# Patient Record
Sex: FEMALE | Race: WHITE | Employment: OTHER | URBAN - METROPOLITAN AREA
[De-identification: names, ages, dates, MRNs, and addresses within clinical notes are randomized per-mention and may not be internally consistent; named-entity substitution may affect disease eponyms.]

---

## 2017-03-14 ENCOUNTER — PATIENT OUTREACH (OUTPATIENT)
Dept: CASE MANAGEMENT | Age: 77
End: 2017-03-14

## 2017-03-23 RX ORDER — FLUTICASONE PROPIONATE 50 MCG
SPRAY, SUSPENSION (ML) NASAL
Qty: 1 BOTTLE | Refills: 3 | Status: SHIPPED | OUTPATIENT
Start: 2017-03-23 | End: 2018-08-14

## 2017-03-29 DIAGNOSIS — E03.9 ACQUIRED HYPOTHYROIDISM: Primary | ICD-10-CM

## 2017-03-30 ENCOUNTER — TELEPHONE (OUTPATIENT)
Dept: FAMILY MEDICINE CLINIC | Facility: CLINIC | Age: 77
End: 2017-03-30

## 2017-03-30 ENCOUNTER — PATIENT MESSAGE (OUTPATIENT)
Dept: FAMILY MEDICINE CLINIC | Facility: CLINIC | Age: 77
End: 2017-03-30

## 2017-03-31 ENCOUNTER — TELEPHONE (OUTPATIENT)
Dept: FAMILY MEDICINE CLINIC | Facility: CLINIC | Age: 77
End: 2017-03-31

## 2017-03-31 NOTE — TELEPHONE ENCOUNTER
Pharmacy called- spoke to pharmacist- we were contacted by them to combine 2 pills to one. Patient takes a combination of Nature-Throid 32.5mg and 16.25. Now pharmacy saying they did not. They will ask patient her preference and fill accordingly.

## 2017-03-31 NOTE — TELEPHONE ENCOUNTER
From: Edwige Guerrero  To: Angel Ross MD  Sent: 3/30/2017 10:47 AM CDT  Subject: Prescription Question    For the Thyroid prescription and the Estriol cream only, we use [facility].  [facility] called with the prescription for the Thyroid refill a

## 2017-03-31 NOTE — TELEPHONE ENCOUNTER
Daryl Verma calling, questions on patients med Cassius Felicia Ramiresid medication, she normally takes 32.5mgs and 16mgs.    They did not received the correct dosage to fill., please call

## 2017-05-03 ENCOUNTER — TELEPHONE (OUTPATIENT)
Dept: FAMILY MEDICINE CLINIC | Facility: CLINIC | Age: 77
End: 2017-05-03

## 2017-05-24 ENCOUNTER — TELEPHONE (OUTPATIENT)
Dept: FAMILY MEDICINE CLINIC | Facility: CLINIC | Age: 77
End: 2017-05-24

## 2017-05-24 RX ORDER — ESTRADIOL 0.1 MG/G
1 CREAM VAGINAL DAILY
Qty: 42.5 G | Refills: 2 | Status: SHIPPED | OUTPATIENT
Start: 2017-05-24 | End: 2017-05-24

## 2017-05-24 RX ORDER — ESTRADIOL 0.1 MG/G
1 CREAM VAGINAL DAILY
Qty: 42.5 G | Refills: 2 | Status: SHIPPED | OUTPATIENT
Start: 2017-05-24 | End: 2017-05-25 | Stop reason: ALTCHOICE

## 2017-05-25 ENCOUNTER — TELEPHONE (OUTPATIENT)
Dept: FAMILY MEDICINE CLINIC | Facility: CLINIC | Age: 77
End: 2017-05-25

## 2017-05-25 NOTE — TELEPHONE ENCOUNTER
Informed Usman of 's approval for this med 3 days per week. She expressed understanding. Task completed.

## 2017-05-25 NOTE — TELEPHONE ENCOUNTER
Dr. Elliot Oscar,  I spoke to [facility] and she said this is the same as Estradiol. Patient has had in the past but I do not see in Epic.  Please advise    Usman from [facility] called stated that pt is requesting the Estriol prescription i

## 2017-05-25 NOTE — TELEPHONE ENCOUNTER
Usman from [address] [place] [address] called stated that pt is requesting the Estriol prescription instead. Please call and advise.

## 2017-07-07 ENCOUNTER — APPOINTMENT (OUTPATIENT)
Dept: GENERAL RADIOLOGY | Age: 77
End: 2017-07-07
Attending: FAMILY MEDICINE
Payer: MEDICARE

## 2017-07-07 ENCOUNTER — HOSPITAL ENCOUNTER (OUTPATIENT)
Age: 77
Discharge: HOME | End: 2017-07-07
Attending: FAMILY MEDICINE
Payer: MEDICARE

## 2017-07-07 ENCOUNTER — TELEPHONE (OUTPATIENT)
Dept: FAMILY MEDICINE CLINIC | Facility: CLINIC | Age: 77
End: 2017-07-07

## 2017-07-07 VITALS
HEIGHT: 64 IN | DIASTOLIC BLOOD PRESSURE: 85 MMHG | BODY MASS INDEX: 18.1 KG/M2 | SYSTOLIC BLOOD PRESSURE: 152 MMHG | HEART RATE: 87 BPM | RESPIRATION RATE: 16 BRPM | WEIGHT: 106 LBS | OXYGEN SATURATION: 99 % | TEMPERATURE: 99 F

## 2017-07-07 DIAGNOSIS — R03.0 BLOOD PRESSURE ELEVATED WITHOUT HISTORY OF HTN: ICD-10-CM

## 2017-07-07 DIAGNOSIS — S29.011A CHEST WALL MUSCLE STRAIN, INITIAL ENCOUNTER: Primary | ICD-10-CM

## 2017-07-07 PROCEDURE — 99213 OFFICE O/P EST LOW 20 MIN: CPT

## 2017-07-07 PROCEDURE — 71020 XR CHEST PA + LAT CHEST (CPT=71020): CPT | Performed by: FAMILY MEDICINE

## 2017-07-07 RX ORDER — CHLORAL HYDRATE 500 MG
1000 CAPSULE ORAL DAILY
COMMUNITY
End: 2018-10-16

## 2017-07-07 RX ORDER — NITROFURANTOIN 25; 75 MG/1; MG/1
100 CAPSULE ORAL 2 TIMES DAILY
Qty: 14 CAPSULE | Refills: 0 | Status: SHIPPED | OUTPATIENT
Start: 2017-07-07 | End: 2017-07-07

## 2017-07-07 NOTE — ED INITIAL ASSESSMENT (HPI)
The patient is here with complaints of intermittent left breast pain x 1 week. She states she does a lot of yoga and has completed some new moves. She has also been using the left arm to lift a heavy watering can to water hanging flowers.   She has been u

## 2017-07-07 NOTE — TELEPHONE ENCOUNTER
Patient states she has a pulling sensation under left breast bone. Hx of lung issues. Referred to Urgent care. Started 1 1/2 weeks ago. Denies chest pain.

## 2017-07-07 NOTE — ED PROVIDER NOTES
Patient Seen in: [facility]    History   Patient presents with:  Breast Problem (mammary)    Stated Complaint: pain in left breast area x 1 week    HPI    Pleasant 62-year-old lady comes in today for evaluation.   Patient st MCG/ACT Nasal Suspension,  INHALE 2 SPRAYS IN EACH NOSTRIL DAILY   PROAIR  (90 BASE) MCG/ACT Inhalation Aero Soln,  INHALE 2 PUFFS INTO THE LUNGS EVERY 6 (SIX) HOURS AS NEEDED FOR WHEEZING.    benzonatate 100 MG Oral Cap,  Take 1 capsule (100 mg tota EOMI.  EENT: TMs wnl bilaterally, Nares wnl, OP - wnl, no obstructions, Uvula in midline   NECK: FROM, supple, No LAD noted bilaterally  LUNGS: CTAB bilaterally, no wheeze or rales, no rhonchi   CV: S1 S2 heard, no murmur, no gallop  Chest wall.:Equal and  on 7/07/2017 at 16:46     Approved by:  Alex Ribera DO            ============================================================  ED Course  ------------------------------------------------------------  Time: 07/07 2089  Comment: Althea and discussed CXR

## 2017-07-07 NOTE — TELEPHONE ENCOUNTER
Pt states she has scar tissue under her lung and asthma/ she was doing some lifting and feels a burning sensation by her lung/ heart area.  Please advise

## 2017-10-16 ENCOUNTER — OFFICE VISIT (OUTPATIENT)
Dept: FAMILY MEDICINE CLINIC | Facility: CLINIC | Age: 77
End: 2017-10-16

## 2017-10-16 ENCOUNTER — APPOINTMENT (OUTPATIENT)
Dept: LAB | Age: 77
End: 2017-10-16
Attending: FAMILY MEDICINE
Payer: MEDICARE

## 2017-10-16 VITALS
HEIGHT: 63 IN | RESPIRATION RATE: 16 BRPM | TEMPERATURE: 98 F | SYSTOLIC BLOOD PRESSURE: 112 MMHG | DIASTOLIC BLOOD PRESSURE: 68 MMHG | OXYGEN SATURATION: 98 % | BODY MASS INDEX: 18.43 KG/M2 | WEIGHT: 104 LBS | HEART RATE: 103 BPM

## 2017-10-16 DIAGNOSIS — Z00.00 ENCOUNTER FOR ANNUAL HEALTH EXAMINATION: Primary | ICD-10-CM

## 2017-10-16 DIAGNOSIS — Z12.31 VISIT FOR SCREENING MAMMOGRAM: ICD-10-CM

## 2017-10-16 DIAGNOSIS — Z13.6 SCREENING FOR CARDIOVASCULAR CONDITION: ICD-10-CM

## 2017-10-16 DIAGNOSIS — Z23 NEED FOR VACCINATION: ICD-10-CM

## 2017-10-16 DIAGNOSIS — Z12.11 SCREENING FOR COLON CANCER: ICD-10-CM

## 2017-10-16 DIAGNOSIS — Z00.00 ENCOUNTER FOR ANNUAL HEALTH EXAMINATION: ICD-10-CM

## 2017-10-16 PROCEDURE — G0009 ADMIN PNEUMOCOCCAL VACCINE: HCPCS | Performed by: FAMILY MEDICINE

## 2017-10-16 PROCEDURE — 80061 LIPID PANEL: CPT

## 2017-10-16 PROCEDURE — 80053 COMPREHEN METABOLIC PANEL: CPT

## 2017-10-16 PROCEDURE — 90670 PCV13 VACCINE IM: CPT | Performed by: FAMILY MEDICINE

## 2017-10-16 PROCEDURE — G0008 ADMIN INFLUENZA VIRUS VAC: HCPCS | Performed by: FAMILY MEDICINE

## 2017-10-16 PROCEDURE — 90653 IIV ADJUVANT VACCINE IM: CPT | Performed by: FAMILY MEDICINE

## 2017-10-16 PROCEDURE — 84443 ASSAY THYROID STIM HORMONE: CPT

## 2017-10-16 PROCEDURE — 36415 COLL VENOUS BLD VENIPUNCTURE: CPT

## 2017-10-16 PROCEDURE — G0439 PPPS, SUBSEQ VISIT: HCPCS | Performed by: FAMILY MEDICINE

## 2017-10-16 NOTE — PROGRESS NOTES
HPI:   Sho Rueda is a 78year old female who presents for a Medicare Subsequent Annual Wellness visit (Pt already had Initial Annual Wellness). Complains of pain in the right hip.   Had stretched a groin muscle several years ago slipping on a wet daily   FLUTICASONE PROPIONATE 50 MCG/ACT Nasal Suspension INHALE 2 SPRAYS IN EACH NOSTRIL DAILY   PROAIR  (90 BASE) MCG/ACT Inhalation Aero Soln INHALE 2 PUFFS INTO THE LUNGS EVERY 6 (SIX) HOURS AS NEEDED FOR WHEEZING.       MEDICAL INFORMATION:   S SINGLE DOSE (53758) FLU CLINIC 10/05/2015   • DTAP 04/04/2015   • HIGH DOSE FLU 65 YRS AND OLDER PRSV FREE SINGLE D (58949) FLU CLINIC 10/10/2016   • Influenza 10/05/2009, 10/06/2010, 10/10/2011, 10/08/2012   • Influenza Vaccine, No Preserv, 3YR + 10/06/20 activity?: Heavy    How would you describe your current health state?: Good    How do you maintain positive mental well-being?: Social Interaction;Puzzles; Visiting Friends; Visiting Family    If you are a male age 38-65 or a female age 47-67, do you take as patient and document. Then, refresh your progress note to see your input here.   Cognitive Assessment     What day of the week is this?: Correct    What month is it?: Correct    What year is it?: Correct    Recall \"Ball\": Correct    Recall \"Flag\": Co Mammogram      Mammogram Annually to 76, then as discussed Mammogram,1 Yr due on 10/10/2017 Update Health Maintenance if applicable     Immunizations (Update Immunization Activity if applicable)     Influenza  Covered Annually 10/10/2016 Please get every y found for: A1C No flowsheet data found. Creat/alb ratio  Annually      LDL  Annually LDL-CHOLESTEROL (mg/dL (calc))   Date Value   10/03/2011 91     LDL Cholesterol (mg/dL)   Date Value   10/10/2016 90    No flowsheet data found.      Dilated Eye exam  A

## 2017-10-16 NOTE — PATIENT INSTRUCTIONS
Monique Olsen's SCREENING SCHEDULE   Tests on this list are recommended by your physician but may not be covered, or covered at this frequency, by your insurer. Please check with your insurance carrier before scheduling to verify coverage.    PREVENTATI following criteria:   • Men who are 73-68 years old and have smoked more than 100 cigarettes in their lifetime   • Anyone with a family history    Colorectal Cancer Screening  Covered up to Age 76     Colonoscopy Screen   Covered every 10 years- more often or performed in visit on 10/10/16  -FLU VACC PRSV FREE INC ANTIG   -FLU VACC PRSV FREE INC ANTIG   Orders placed or performed in visit on 10/05/15  -FLU VAC NO PRSV 4 ADRIANA 3 YRS+   Orders placed or performed in visit on 10/06/14  -FLU VAC NO PRSV 4 ADRIANA 3 YR including definitions of the different types of Advance Directives. It also has the State forms available on it's website for anyone to review and print using their home computer and printer. (the forms are also available in [address])  www. [employer]. or

## 2017-10-18 ENCOUNTER — HOSPITAL ENCOUNTER (OUTPATIENT)
Dept: MAMMOGRAPHY | Age: 77
Discharge: HOME | End: 2017-10-18
Attending: FAMILY MEDICINE
Payer: MEDICARE

## 2017-10-18 DIAGNOSIS — Z12.31 VISIT FOR SCREENING MAMMOGRAM: ICD-10-CM

## 2017-10-18 PROCEDURE — 82272 OCCULT BLD FECES 1-3 TESTS: CPT

## 2017-10-18 PROCEDURE — 77067 SCR MAMMO BI INCL CAD: CPT | Performed by: FAMILY MEDICINE

## 2017-10-25 ENCOUNTER — APPOINTMENT (OUTPATIENT)
Dept: LAB | Facility: HOSPITAL | Age: 77
End: 2017-10-25
Attending: FAMILY MEDICINE
Payer: MEDICARE

## 2017-10-25 DIAGNOSIS — Z12.11 SCREENING FOR COLON CANCER: ICD-10-CM

## 2017-11-10 ENCOUNTER — PATIENT MESSAGE (OUTPATIENT)
Dept: FAMILY MEDICINE CLINIC | Facility: CLINIC | Age: 77
End: 2017-11-10

## 2017-11-10 DIAGNOSIS — J01.10 ACUTE NON-RECURRENT FRONTAL SINUSITIS: ICD-10-CM

## 2017-11-10 RX ORDER — CEPHALEXIN 500 MG/1
500 CAPSULE ORAL 3 TIMES DAILY
Qty: 30 CAPSULE | Refills: 0 | Status: SHIPPED | OUTPATIENT
Start: 2017-11-10 | End: 2017-11-20

## 2017-11-10 NOTE — TELEPHONE ENCOUNTER
----- Message from Lawrence Conteh sent at 11/10/2017 10:37 AM CST -----  Regarding: Prescription Question  Contact: [phone]  I have taken Cephalerin 500 mg. in the past for an upper respiratory or sinus infection.   I am leaving for [place] next month

## 2017-12-12 RX ORDER — MONTELUKAST SODIUM 10 MG/1
TABLET ORAL
Qty: 90 TABLET | Refills: 2 | Status: SHIPPED | OUTPATIENT
Start: 2017-12-12 | End: 2018-11-05

## 2018-02-01 ENCOUNTER — TELEPHONE (OUTPATIENT)
Dept: FAMILY MEDICINE CLINIC | Facility: CLINIC | Age: 78
End: 2018-02-01

## 2018-02-01 NOTE — TELEPHONE ENCOUNTER
Patient taking Cephalexin- tid-has medicine at home. recommended BRAT diet,increase probiotic. We have not seen patient for uti. Patient to keep us updated.

## 2018-03-22 NOTE — TELEPHONE ENCOUNTER
Last OV 10/16/17  Last refill  Nature Thyroid 3/4 48.75mg  3/3017    .   Thyroid:    Lab Results  Component Value Date   TSH 2.080 10/16/2017   TSH 3.220 10/10/2016   TSH 2.920 09/24/2015   T4F 0.8 (L) 10/06/2014

## 2018-03-26 ENCOUNTER — TELEPHONE (OUTPATIENT)
Dept: FAMILY MEDICINE CLINIC | Facility: CLINIC | Age: 78
End: 2018-03-26

## 2018-03-26 NOTE — TELEPHONE ENCOUNTER
Usman from [facility] called in regards for prescription for patient that was faxed over on Thursday. Prescription was for Thyroid (NATURE-THROID) 48.75 MG Oral Tab.

## 2018-04-13 ENCOUNTER — TELEPHONE (OUTPATIENT)
Dept: FAMILY MEDICINE CLINIC | Facility: CLINIC | Age: 78
End: 2018-04-13

## 2018-04-13 NOTE — TELEPHONE ENCOUNTER
Spoke to pt she states aware med not covered but doesn't want to change meds at this time she will pay cash

## 2018-04-13 NOTE — TELEPHONE ENCOUNTER
Gerry Marc from [address] [place] [address] called stated the insurance does not cover any natural meds/Nature-throid. Please prescribe another alternative. Please call and advise.

## 2018-06-12 ENCOUNTER — OFFICE VISIT (OUTPATIENT)
Dept: FAMILY MEDICINE CLINIC | Facility: CLINIC | Age: 78
End: 2018-06-12

## 2018-06-12 ENCOUNTER — PATIENT MESSAGE (OUTPATIENT)
Dept: FAMILY MEDICINE CLINIC | Facility: CLINIC | Age: 78
End: 2018-06-12

## 2018-06-12 VITALS
HEIGHT: 63 IN | RESPIRATION RATE: 18 BRPM | OXYGEN SATURATION: 98 % | HEART RATE: 102 BPM | TEMPERATURE: 98 F | DIASTOLIC BLOOD PRESSURE: 82 MMHG | SYSTOLIC BLOOD PRESSURE: 120 MMHG | BODY MASS INDEX: 18.43 KG/M2 | WEIGHT: 104 LBS

## 2018-06-12 DIAGNOSIS — R39.15 URINARY URGENCY: Primary | ICD-10-CM

## 2018-06-12 PROCEDURE — 99213 OFFICE O/P EST LOW 20 MIN: CPT | Performed by: FAMILY MEDICINE

## 2018-06-12 PROCEDURE — 87077 CULTURE AEROBIC IDENTIFY: CPT | Performed by: FAMILY MEDICINE

## 2018-06-12 PROCEDURE — 87086 URINE CULTURE/COLONY COUNT: CPT | Performed by: FAMILY MEDICINE

## 2018-06-12 PROCEDURE — 87186 SC STD MICRODIL/AGAR DIL: CPT | Performed by: FAMILY MEDICINE

## 2018-06-12 PROCEDURE — 81003 URINALYSIS AUTO W/O SCOPE: CPT | Performed by: FAMILY MEDICINE

## 2018-06-12 NOTE — PROGRESS NOTES
Reinaldo, [facility]  Cell # [phone]    Here for follow-up of recurrent UTI. She had a prescription for me for cephalexin which she took in late January. She was down in [place].   Symptoms improved but 2 days off of the antibiotics and they c PUFFS INTO THE LUNGS EVERY 6 (SIX) HOURS AS NEEDED FOR WHEEZING. Disp: 8.5 g Rfl: 0     ALLERGIES:   Advil [Ibuprofen]; Aspirin; Benadryl [Altaryl]; Codeine [Opioid Analgesics];  Erythromycin; Naproxen; Penicillins  FAMILY HISTORY  Family History   Problem

## 2018-06-12 NOTE — TELEPHONE ENCOUNTER
From: Jamilah Handing  To: Francisco Javier Orozco MD  Sent: 6/12/2018 12:22 PM CDT  Subject: Non-Urgent Medical Question    I received a notice that I had an after visit summary on e-mail. I don't see it on the web site.

## 2018-06-14 DIAGNOSIS — N39.0 URINARY TRACT INFECTION WITHOUT HEMATURIA, SITE UNSPECIFIED: Primary | ICD-10-CM

## 2018-06-14 RX ORDER — SULFAMETHOXAZOLE AND TRIMETHOPRIM 800; 160 MG/1; MG/1
1 TABLET ORAL 2 TIMES DAILY
Qty: 6 TABLET | Refills: 0 | Status: SHIPPED | OUTPATIENT
Start: 2018-06-14 | End: 2018-06-17

## 2018-06-19 ENCOUNTER — APPOINTMENT (OUTPATIENT)
Dept: LAB | Age: 78
End: 2018-06-19
Attending: FAMILY MEDICINE
Payer: MEDICARE

## 2018-06-19 DIAGNOSIS — N39.0 URINARY TRACT INFECTION WITHOUT HEMATURIA, SITE UNSPECIFIED: ICD-10-CM

## 2018-06-19 PROCEDURE — 87086 URINE CULTURE/COLONY COUNT: CPT

## 2018-06-19 PROCEDURE — 87186 SC STD MICRODIL/AGAR DIL: CPT

## 2018-06-19 PROCEDURE — 87077 CULTURE AEROBIC IDENTIFY: CPT

## 2018-06-21 ENCOUNTER — TELEPHONE (OUTPATIENT)
Dept: FAMILY MEDICINE CLINIC | Facility: CLINIC | Age: 78
End: 2018-06-21

## 2018-06-21 RX ORDER — LEVOFLOXACIN 500 MG/1
500 TABLET, FILM COATED ORAL DAILY
Qty: 5 TABLET | Refills: 0 | Status: SHIPPED | OUTPATIENT
Start: 2018-06-21 | End: 2018-07-01

## 2018-06-21 NOTE — TELEPHONE ENCOUNTER
----- Message from Carmencita Singh MD sent at 6/21/2018 10:20 AM CDT -----  Urine culture positive,  Allergic to pcn and erythromycin. resistent to ampicillin and sulfa. Treat with levaquin 500mg po daily for 5 days.

## 2018-06-23 ENCOUNTER — TELEPHONE (OUTPATIENT)
Dept: FAMILY MEDICINE CLINIC | Facility: CLINIC | Age: 78
End: 2018-06-23

## 2018-06-23 NOTE — TELEPHONE ENCOUNTER
Urine culture positive,  Allergic to pcn and erythromycin.  resistent to ampicillin and sulfa.  Treat with levaquin 500mg po daily for 5 days. Culture           Please see attached. Patient started on Levaquin per Pepe Saavedra last Thursday.  Today, she re

## 2018-06-25 ENCOUNTER — TELEPHONE (OUTPATIENT)
Dept: FAMILY MEDICINE CLINIC | Facility: CLINIC | Age: 78
End: 2018-06-25

## 2018-06-25 DIAGNOSIS — N39.0 URINARY TRACT INFECTION WITHOUT HEMATURIA, SITE UNSPECIFIED: Primary | ICD-10-CM

## 2018-06-25 NOTE — TELEPHONE ENCOUNTER
Okay for U/A and culture order? Pt wants to be sure infection has cleared and she will finish her meds tomorrow.

## 2018-06-25 NOTE — TELEPHONE ENCOUNTER
Pt has had reoccuring utis and is finished with her medication tomorrow.   Pt wants a lab put in the system to make sure she is clear  Please advise

## 2018-06-26 NOTE — TELEPHONE ENCOUNTER
Last Visit- 6/12/18    Last Refill Nature-Throid - 3/26/18    Last Refill on Estriol Vaginal - 5/26/17    Please approve or deny medications

## 2018-06-26 NOTE — TELEPHONE ENCOUNTER
Needs a refill on Nature Vivek  And  [facility]. She stated she asked for the refill last week. She was calling from the pharmacy and would like this ASAP. Pls call her back when this is refilled.   Or call her if a problem r

## 2018-06-26 NOTE — TELEPHONE ENCOUNTER
Approve/deny?  Last ov 6/12/18- urinary , last filled thyroid 3/26/18 x 3 months, estriol 5/26/17 PRN

## 2018-06-28 ENCOUNTER — APPOINTMENT (OUTPATIENT)
Dept: LAB | Age: 78
End: 2018-06-28
Attending: FAMILY MEDICINE
Payer: MEDICARE

## 2018-06-28 DIAGNOSIS — N39.0 URINARY TRACT INFECTION WITHOUT HEMATURIA, SITE UNSPECIFIED: ICD-10-CM

## 2018-06-28 LAB
BILIRUB UR QL STRIP.AUTO: NEGATIVE
CLARITY UR REFRACT.AUTO: CLEAR
GLUCOSE UR STRIP.AUTO-MCNC: NEGATIVE MG/DL
KETONES UR STRIP.AUTO-MCNC: NEGATIVE MG/DL
LEUKOCYTE ESTERASE UR QL STRIP.AUTO: NEGATIVE
NITRITE UR QL STRIP.AUTO: NEGATIVE
PH UR STRIP.AUTO: 6 [PH] (ref 4.5–8)
PROT UR STRIP.AUTO-MCNC: NEGATIVE MG/DL
RBC UR QL AUTO: NEGATIVE
SP GR UR STRIP.AUTO: <1.005 (ref 1–1.03)
UROBILINOGEN UR STRIP.AUTO-MCNC: <2 MG/DL

## 2018-06-28 PROCEDURE — 81003 URINALYSIS AUTO W/O SCOPE: CPT

## 2018-06-28 PROCEDURE — 87086 URINE CULTURE/COLONY COUNT: CPT

## 2018-08-14 ENCOUNTER — OFFICE VISIT (OUTPATIENT)
Dept: FAMILY MEDICINE CLINIC | Facility: CLINIC | Age: 78
End: 2018-08-14
Payer: MEDICARE

## 2018-08-14 VITALS
BODY MASS INDEX: 18.25 KG/M2 | SYSTOLIC BLOOD PRESSURE: 130 MMHG | TEMPERATURE: 99 F | OXYGEN SATURATION: 96 % | DIASTOLIC BLOOD PRESSURE: 80 MMHG | HEART RATE: 92 BPM | RESPIRATION RATE: 24 BRPM | WEIGHT: 103 LBS | HEIGHT: 63 IN

## 2018-08-14 DIAGNOSIS — R42 VERTIGO: Primary | ICD-10-CM

## 2018-08-14 PROCEDURE — 99213 OFFICE O/P EST LOW 20 MIN: CPT | Performed by: FAMILY MEDICINE

## 2018-08-14 RX ORDER — FLUTICASONE PROPIONATE 50 MCG
2 SPRAY, SUSPENSION (ML) NASAL DAILY
Qty: 1 BOTTLE | Refills: 1 | Status: SHIPPED | OUTPATIENT
Start: 2018-08-14 | End: 2018-10-16 | Stop reason: ALTCHOICE

## 2018-08-14 RX ORDER — MECLIZINE HCL 12.5 MG/1
12.5 TABLET ORAL 3 TIMES DAILY PRN
Qty: 30 TABLET | Refills: 0 | Status: SHIPPED | OUTPATIENT
Start: 2018-08-14 | End: 2018-10-16 | Stop reason: ALTCHOICE

## 2018-08-23 NOTE — PROGRESS NOTES
HPI:    Patient ID: Dea Olvera is a 78year old female. Vertigo   This is a new problem. The current episode started in the past 7 days. The problem occurs daily. The problem has been unchanged. Associated symptoms include vertigo.  Associated sympt Left eye exhibits no discharge. No scleral icterus. Neck: Normal range of motion. Neck supple. No thyromegaly present. Cardiovascular: Normal rate, regular rhythm, normal heart sounds and intact distal pulses.   Exam reveals no gallop and no friction ru

## 2018-09-10 ENCOUNTER — OFFICE VISIT (OUTPATIENT)
Dept: FAMILY MEDICINE CLINIC | Facility: CLINIC | Age: 78
End: 2018-09-10
Payer: MEDICARE

## 2018-09-10 VITALS
WEIGHT: 108 LBS | SYSTOLIC BLOOD PRESSURE: 120 MMHG | DIASTOLIC BLOOD PRESSURE: 70 MMHG | RESPIRATION RATE: 18 BRPM | TEMPERATURE: 98 F | HEIGHT: 63 IN | BODY MASS INDEX: 19.14 KG/M2 | HEART RATE: 86 BPM | OXYGEN SATURATION: 98 %

## 2018-09-10 DIAGNOSIS — J30.89 SEASONAL ALLERGIC RHINITIS DUE TO OTHER ALLERGIC TRIGGER: ICD-10-CM

## 2018-09-10 DIAGNOSIS — J01.00 ACUTE NON-RECURRENT MAXILLARY SINUSITIS: Primary | ICD-10-CM

## 2018-09-10 PROCEDURE — 99213 OFFICE O/P EST LOW 20 MIN: CPT | Performed by: FAMILY MEDICINE

## 2018-09-10 RX ORDER — CEPHALEXIN 500 MG/1
500 CAPSULE ORAL 3 TIMES DAILY
Qty: 30 CAPSULE | Refills: 0 | Status: SHIPPED | OUTPATIENT
Start: 2018-09-10 | End: 2018-10-16

## 2018-09-10 RX ORDER — AZELASTINE 1 MG/ML
1 SPRAY, METERED NASAL 2 TIMES DAILY
Qty: 30 ML | Refills: 2 | Status: SHIPPED | OUTPATIENT
Start: 2018-09-10

## 2018-09-10 NOTE — PROGRESS NOTES
Here with almost a full month of head congestion. She does suffer from ragweed and mold allergies. She saw Dr. Karri Hartley. She was having issues with dizziness and he prescribed meclizine.   Subsequently we have chatted via the my chart and have stop meclizin 1,000 mg by mouth daily. Disp:  Rfl:    PROAIR  (90 BASE) MCG/ACT Inhalation Aero Soln INHALE 2 PUFFS INTO THE LUNGS EVERY 6 (SIX) HOURS AS NEEDED FOR WHEEZING. Disp: 8.5 g Rfl: 0     ALLERGIES:   Advil [Ibuprofen]; Aspirin; Benadryl [Altaryl];  Code

## 2018-09-10 NOTE — PATIENT INSTRUCTIONS
Take a probiotic such as lactobacilis acidophilus while you are taking the antibiotic and for 7 days after the antibiotic is completed. This will help to prevent antibiotic associated diarrhea or C. Difficile. Hold steroid spray.   Start astelin instea

## 2018-09-13 ENCOUNTER — PATIENT MESSAGE (OUTPATIENT)
Dept: FAMILY MEDICINE CLINIC | Facility: CLINIC | Age: 78
End: 2018-09-13

## 2018-09-13 NOTE — TELEPHONE ENCOUNTER
From: Judson Grace  To: Warden Jaki MD  Sent: 9/13/2018 10:36 AM CDT  Subject: Prescription Question    I took the Cephalexin 500 mg. tablet as instructed, 3 times a day, for three days and was experiencing a lot of diarrhea on the third.  I cut valorie

## 2018-10-16 ENCOUNTER — OFFICE VISIT (OUTPATIENT)
Dept: FAMILY MEDICINE CLINIC | Facility: CLINIC | Age: 78
End: 2018-10-16
Payer: MEDICARE

## 2018-10-16 ENCOUNTER — APPOINTMENT (OUTPATIENT)
Dept: LAB | Age: 78
End: 2018-10-16
Attending: FAMILY MEDICINE
Payer: MEDICARE

## 2018-10-16 VITALS
HEIGHT: 63 IN | BODY MASS INDEX: 18.85 KG/M2 | TEMPERATURE: 98 F | DIASTOLIC BLOOD PRESSURE: 70 MMHG | OXYGEN SATURATION: 98 % | SYSTOLIC BLOOD PRESSURE: 138 MMHG | HEART RATE: 95 BPM | RESPIRATION RATE: 18 BRPM | WEIGHT: 106.38 LBS

## 2018-10-16 DIAGNOSIS — Z13.6 SCREENING FOR CARDIOVASCULAR CONDITION: ICD-10-CM

## 2018-10-16 DIAGNOSIS — Z80.3 FH: BREAST CANCER IN FIRST DEGREE RELATIVE: ICD-10-CM

## 2018-10-16 DIAGNOSIS — Z00.00 ENCOUNTER FOR ANNUAL HEALTH EXAMINATION: Primary | ICD-10-CM

## 2018-10-16 DIAGNOSIS — Z12.39 SCREENING FOR BREAST CANCER: ICD-10-CM

## 2018-10-16 DIAGNOSIS — Z00.00 ENCOUNTER FOR ANNUAL HEALTH EXAMINATION: ICD-10-CM

## 2018-10-16 DIAGNOSIS — M17.11 LOCALIZED OSTEOARTHRITIS OF RIGHT KNEE: ICD-10-CM

## 2018-10-16 DIAGNOSIS — J01.00 ACUTE NON-RECURRENT MAXILLARY SINUSITIS: ICD-10-CM

## 2018-10-16 DIAGNOSIS — Z23 NEED FOR VACCINATION: ICD-10-CM

## 2018-10-16 PROCEDURE — 80053 COMPREHEN METABOLIC PANEL: CPT

## 2018-10-16 PROCEDURE — G0439 PPPS, SUBSEQ VISIT: HCPCS | Performed by: FAMILY MEDICINE

## 2018-10-16 PROCEDURE — 80061 LIPID PANEL: CPT

## 2018-10-16 PROCEDURE — G0008 ADMIN INFLUENZA VIRUS VAC: HCPCS | Performed by: FAMILY MEDICINE

## 2018-10-16 PROCEDURE — 90653 IIV ADJUVANT VACCINE IM: CPT | Performed by: FAMILY MEDICINE

## 2018-10-16 PROCEDURE — 36415 COLL VENOUS BLD VENIPUNCTURE: CPT

## 2018-10-16 RX ORDER — CEPHALEXIN 500 MG/1
CAPSULE ORAL
Qty: 30 CAPSULE | Refills: 0 | Status: SHIPPED | OUTPATIENT
Start: 2018-10-16 | End: 2019-07-08

## 2018-10-16 RX ORDER — CHLORAL HYDRATE 500 MG
2000 CAPSULE ORAL DAILY
Qty: 60 CAPSULE | Refills: 12 | COMMUNITY
Start: 2018-10-16

## 2018-10-16 NOTE — PATIENT INSTRUCTIONS
Monique Olsen's SCREENING SCHEDULE   Tests on this list are recommended by your physician but may not be covered, or covered at this frequency, by your insurer. Please check with your insurance carrier before scheduling to verify coverage.    PREVENTATI who are 73-68 years old and have smoked more than 100 cigarettes in their lifetime   • Anyone with a family history    Colorectal Cancer Screening  Covered up to Age 76     Colonoscopy Screen   Covered every 10 years- more often if abnormal Colonoscopy due display for this patient.  Please get this Mammogram regularly   Immunizations      Influenza  Covered Annually Orders placed or performed in visit on 10/10/16   • FLU VACC PRSV FREE INC ANTIG   • FLU VACC PRSV FREE INC ANTIG   Orders placed or performed in http://www. idph.state. [place]/public/books/advin.htm  A link to the [employer]. This site has a lot of good information including definitions of the different types of Advance Directives.  It also has the State forms available on it's webs

## 2018-10-16 NOTE — PROGRESS NOTES
HPI:   Tanya Pereira is a 78year old female who presents for a Medicare Subsequent Annual Wellness visit (Pt already had Initial Annual Wellness). Complains of right knee pain. No locking or giving out.   Radiates down the front of the knee into th Problem List:     Postmenopausal atrophic vaginitis     Disorders of bilirubin excretion     Asthma     Esophageal reflux     Arthropathy     Primary localized osteoarthrosis, hand     Hypothyroid     Allergic rhinitis due to allergen     Mitral valve prol Personal history of pneumonia (recurrent), and Sprain of lumbar region. She  has a past surgical history that includes cholecystectomy; other surgical history; tonsillectomy; and colonoscopy.     Her family history includes Arthritis in her maternal gran HIGH DOSE FLU 65 YRS AND OLDER PRSV FREE SINGLE D (49125) FLU CLINIC 10/10/2016   • Influenza 10/05/2009, 10/06/2010, 10/10/2011, 10/08/2012   • Pneumococcal (Prevnar 13) 10/16/2017   • Pneumovax 23 07/17/2006, 11/01/2007, 10/07/2008   • TD 07/11/2005   • Family;Puzzles; Visiting Friends      This section provided for quick review of chart, separate sheet to patient  [address] [place] [address],[address] Internal Lab or Procedure External Lab or Procedure   Diabetes Screening      HbgA1C   Annually Maintenance if applicable     Immunizations (Update Immunization Activity if applicable)     Influenza  Covered Annually 10/16/2017 Please get every year    Pneumococcal 13 (Prevnar)  Covered Once after 65 10/16/2017 Please get once after your Shelly Dexterer

## 2018-10-17 ENCOUNTER — HOSPITAL ENCOUNTER (OUTPATIENT)
Dept: MAMMOGRAPHY | Age: 78
Discharge: HOME | End: 2018-10-17
Attending: FAMILY MEDICINE
Payer: MEDICARE

## 2018-10-17 DIAGNOSIS — Z80.3 FH: BREAST CANCER IN FIRST DEGREE RELATIVE: ICD-10-CM

## 2018-10-17 DIAGNOSIS — Z12.39 SCREENING FOR BREAST CANCER: ICD-10-CM

## 2018-10-17 PROCEDURE — 77067 SCR MAMMO BI INCL CAD: CPT | Performed by: FAMILY MEDICINE

## 2018-10-17 PROCEDURE — 77063 BREAST TOMOSYNTHESIS BI: CPT | Performed by: FAMILY MEDICINE

## 2018-10-25 ENCOUNTER — OFFICE VISIT (OUTPATIENT)
Dept: FAMILY MEDICINE CLINIC | Facility: CLINIC | Age: 78
End: 2018-10-25
Payer: MEDICARE

## 2018-10-25 VITALS
BODY MASS INDEX: 18.78 KG/M2 | HEART RATE: 75 BPM | HEIGHT: 63 IN | OXYGEN SATURATION: 98 % | RESPIRATION RATE: 18 BRPM | WEIGHT: 106 LBS | SYSTOLIC BLOOD PRESSURE: 132 MMHG | DIASTOLIC BLOOD PRESSURE: 84 MMHG

## 2018-10-25 DIAGNOSIS — M17.11 PRIMARY OSTEOARTHRITIS OF RIGHT KNEE: Primary | ICD-10-CM

## 2018-10-25 PROCEDURE — 20610 DRAIN/INJ JOINT/BURSA W/O US: CPT | Performed by: FAMILY MEDICINE

## 2018-10-25 RX ORDER — TRIAMCINOLONE ACETONIDE 40 MG/ML
40 INJECTION, SUSPENSION INTRA-ARTICULAR; INTRAMUSCULAR ONCE
Status: COMPLETED | OUTPATIENT
Start: 2018-10-25 | End: 2018-10-25

## 2018-10-25 RX ADMIN — TRIAMCINOLONE ACETONIDE 40 MG: 40 INJECTION, SUSPENSION INTRA-ARTICULAR; INTRAMUSCULAR at 13:57:00

## 2018-10-25 NOTE — PROGRESS NOTES
Here for steroid injection into the right knee for osteoarthritis. Examination reveals crepitus. Area prepped with Betadine x3 and alcohol.   Using sterile technique 40 mg of Kenalog and 1 mL of lidocaine with epinephrine were injected into the right kn

## 2018-11-06 RX ORDER — MONTELUKAST SODIUM 10 MG/1
TABLET ORAL
Qty: 90 TABLET | Refills: 1 | Status: SHIPPED | OUTPATIENT
Start: 2018-11-06 | End: 2019-06-10

## 2018-11-08 ENCOUNTER — TELEPHONE (OUTPATIENT)
Dept: FAMILY MEDICINE CLINIC | Facility: CLINIC | Age: 78
End: 2018-11-08

## 2019-02-21 ENCOUNTER — PATIENT MESSAGE (OUTPATIENT)
Dept: FAMILY MEDICINE CLINIC | Facility: CLINIC | Age: 79
End: 2019-02-21

## 2019-02-21 RX ORDER — LEVOFLOXACIN 500 MG/1
500 TABLET, FILM COATED ORAL DAILY
Qty: 5 TABLET | Refills: 0 | Status: SHIPPED | OUTPATIENT
Start: 2019-02-21 | End: 2019-02-26

## 2019-02-21 NOTE — TELEPHONE ENCOUNTER
PLease see below, in June pt had recurrent UTI was put on bactrim had repeat urine culture 5 days later was resistant to Bactrim was then put on Levaquin 500mg daily x 5 days. Do you want to prescribe something or pt to be seen for urine culture?

## 2019-02-21 NOTE — TELEPHONE ENCOUNTER
From: Deedee Sanches  To: Eddy Boswell MD  Sent: 2/21/2019 7:08 AM CST  Subject: Other    I am in [place] and have a bladder infection.  I went on Cephalexin 500mg you prescribed to bring with me in case I had a repeat infection like I had last year wh

## 2019-06-11 RX ORDER — MONTELUKAST SODIUM 10 MG/1
TABLET ORAL
Qty: 90 TABLET | Refills: 0 | Status: SHIPPED | OUTPATIENT
Start: 2019-06-11 | End: 2019-09-23

## 2019-07-08 ENCOUNTER — HOSPITAL ENCOUNTER (OUTPATIENT)
Age: 79
Discharge: HOME | End: 2019-07-08
Attending: FAMILY MEDICINE
Payer: MEDICARE

## 2019-07-08 VITALS
OXYGEN SATURATION: 96 % | SYSTOLIC BLOOD PRESSURE: 143 MMHG | RESPIRATION RATE: 16 BRPM | HEIGHT: 63.5 IN | WEIGHT: 106 LBS | BODY MASS INDEX: 18.55 KG/M2 | HEART RATE: 97 BPM | DIASTOLIC BLOOD PRESSURE: 87 MMHG

## 2019-07-08 DIAGNOSIS — J01.00 ACUTE NON-RECURRENT MAXILLARY SINUSITIS: ICD-10-CM

## 2019-07-08 DIAGNOSIS — N30.01 ACUTE CYSTITIS WITH HEMATURIA: Primary | ICD-10-CM

## 2019-07-08 LAB
POCT BILIRUBIN URINE: NEGATIVE
POCT GLUCOSE URINE: NEGATIVE MG/DL
POCT KETONE URINE: NEGATIVE MG/DL
POCT NITRITE URINE: NEGATIVE
POCT PH URINE: 6 (ref 5–8)
POCT PROTEIN URINE: NEGATIVE MG/DL
POCT SPECIFIC GRAVITY URINE: 1
POCT UROBILINOGEN URINE: 0.2 MG/DL

## 2019-07-08 PROCEDURE — 81002 URINALYSIS NONAUTO W/O SCOPE: CPT | Performed by: FAMILY MEDICINE

## 2019-07-08 PROCEDURE — 99214 OFFICE O/P EST MOD 30 MIN: CPT

## 2019-07-08 PROCEDURE — 87086 URINE CULTURE/COLONY COUNT: CPT | Performed by: FAMILY MEDICINE

## 2019-07-08 RX ORDER — CEPHALEXIN 500 MG/1
500 CAPSULE ORAL 3 TIMES DAILY
Qty: 21 CAPSULE | Refills: 0 | Status: SHIPPED | OUTPATIENT
Start: 2019-07-08 | End: 2019-10-10

## 2019-07-09 NOTE — ED PROVIDER NOTES
Patient Seen in: [facility]    History   Patient presents with:  Urinary Symptoms (urologic)    Stated Complaint: Eval-G , frequent urination    HPI    53-year-old female with urinary frequency, urgency, dysuria that starte midline. Neck: Supple, NT, FROM. No meningeal signs. LAD: No lymphadenopathy. Heart: S1,S2 RRR, No murmur  Lungs: CTA bilateral. No wheezes rales or rhonchi. Neck: Supple. FROM  Abd: +BS. soft. NT. No rebound. No guarding.  No CVAT to percussion José  Ne

## 2019-07-14 ENCOUNTER — PATIENT MESSAGE (OUTPATIENT)
Dept: FAMILY MEDICINE CLINIC | Facility: CLINIC | Age: 79
End: 2019-07-14

## 2019-07-15 ENCOUNTER — PATIENT MESSAGE (OUTPATIENT)
Dept: FAMILY MEDICINE CLINIC | Facility: CLINIC | Age: 79
End: 2019-07-15

## 2019-07-15 NOTE — TELEPHONE ENCOUNTER
From: Virginia Garza  To: Marlena Krueger MD  Sent: 7/15/2019 11:43 AM CDT  Subject: Other    I tried to answer the question about whether I was still experiencing symptoms of the urinary infection, but the page was closed to reply.  I have one more pill

## 2019-07-15 NOTE — TELEPHONE ENCOUNTER
From: Lawrence Conteh  To: Brendan Hernandez MD  Sent: 7/14/2019 3:39 PM CDT  Subject: Visit Follow-up Question    This past week I went into the Urgent Care center on [address] [place] [address] and was diagnosed with a uterine tract infection.  Dr. Charlene Farr prematacribed

## 2019-07-24 ENCOUNTER — OFFICE VISIT (OUTPATIENT)
Dept: FAMILY MEDICINE CLINIC | Facility: CLINIC | Age: 79
End: 2019-07-24
Payer: MEDICARE

## 2019-07-24 VITALS
HEART RATE: 76 BPM | HEIGHT: 63 IN | OXYGEN SATURATION: 98 % | RESPIRATION RATE: 18 BRPM | WEIGHT: 109 LBS | SYSTOLIC BLOOD PRESSURE: 128 MMHG | BODY MASS INDEX: 19.31 KG/M2 | DIASTOLIC BLOOD PRESSURE: 80 MMHG

## 2019-07-24 DIAGNOSIS — S76.219A GROIN STRAIN, INITIAL ENCOUNTER: Primary | ICD-10-CM

## 2019-07-24 PROCEDURE — 99213 OFFICE O/P EST LOW 20 MIN: CPT | Performed by: FAMILY MEDICINE

## 2019-07-24 NOTE — PROGRESS NOTES
Here with pain in the right hip and right knee. She was doing an exercise with her feet up against the baseboard of the wall and spreading her legs. The next day she can hardly walk. There is pain across the front of the groin.   The pain then moves down Analgesics];  Erythromycin; Naproxen; Penicillins  FAMILY HISTORY  Family History   Problem Relation Age of Onset   • Other (prostate disorders) Father    • Cancer Mother 68        breast   • Breast Cancer Mother         pt not sure if her mother had breast

## 2019-08-29 ENCOUNTER — TELEPHONE (OUTPATIENT)
Dept: FAMILY MEDICINE CLINIC | Facility: CLINIC | Age: 79
End: 2019-08-29

## 2019-08-29 NOTE — TELEPHONE ENCOUNTER
Estriol 10 % Does not apply Cream. Disp 43 G with 2 refills if able. . Route: 1 g by Does not apply route 3 (three) times a week. Intravaginally. Prn. - please send to [facility] on File.

## 2019-09-24 RX ORDER — MONTELUKAST SODIUM 10 MG/1
TABLET ORAL
Qty: 90 TABLET | Refills: 1 | Status: SHIPPED | OUTPATIENT
Start: 2019-09-24 | End: 2020-05-06

## 2019-10-10 ENCOUNTER — APPOINTMENT (OUTPATIENT)
Dept: LAB | Age: 79
End: 2019-10-10
Attending: FAMILY MEDICINE
Payer: MEDICARE

## 2019-10-10 ENCOUNTER — OFFICE VISIT (OUTPATIENT)
Dept: FAMILY MEDICINE CLINIC | Facility: CLINIC | Age: 79
End: 2019-10-10
Payer: MEDICARE

## 2019-10-10 VITALS
HEART RATE: 105 BPM | SYSTOLIC BLOOD PRESSURE: 140 MMHG | WEIGHT: 105 LBS | BODY MASS INDEX: 18.84 KG/M2 | HEIGHT: 62.6 IN | RESPIRATION RATE: 18 BRPM | TEMPERATURE: 98 F | DIASTOLIC BLOOD PRESSURE: 80 MMHG

## 2019-10-10 DIAGNOSIS — J45.20 MILD INTERMITTENT ASTHMA WITHOUT COMPLICATION: ICD-10-CM

## 2019-10-10 DIAGNOSIS — Z12.31 ENCOUNTER FOR SCREENING MAMMOGRAM FOR MALIGNANT NEOPLASM OF BREAST: ICD-10-CM

## 2019-10-10 DIAGNOSIS — E03.8 OTHER SPECIFIED HYPOTHYROIDISM: ICD-10-CM

## 2019-10-10 DIAGNOSIS — Z13.6 SCREENING FOR CARDIOVASCULAR CONDITION: ICD-10-CM

## 2019-10-10 DIAGNOSIS — N39.0 RECURRENT UTI: ICD-10-CM

## 2019-10-10 DIAGNOSIS — Z12.39 SCREENING FOR BREAST CANCER: ICD-10-CM

## 2019-10-10 DIAGNOSIS — S29.012A RHOMBOID MUSCLE STRAIN, INITIAL ENCOUNTER: ICD-10-CM

## 2019-10-10 DIAGNOSIS — S76.211D GROIN STRAIN, RIGHT, SUBSEQUENT ENCOUNTER: ICD-10-CM

## 2019-10-10 DIAGNOSIS — Z23 FLU VACCINE NEED: ICD-10-CM

## 2019-10-10 DIAGNOSIS — Z00.00 ENCOUNTER FOR ANNUAL HEALTH EXAMINATION: Primary | ICD-10-CM

## 2019-10-10 PROCEDURE — 36415 COLL VENOUS BLD VENIPUNCTURE: CPT

## 2019-10-10 PROCEDURE — 84443 ASSAY THYROID STIM HORMONE: CPT

## 2019-10-10 PROCEDURE — 99213 OFFICE O/P EST LOW 20 MIN: CPT | Performed by: FAMILY MEDICINE

## 2019-10-10 PROCEDURE — 80053 COMPREHEN METABOLIC PANEL: CPT

## 2019-10-10 PROCEDURE — G0439 PPPS, SUBSEQ VISIT: HCPCS | Performed by: FAMILY MEDICINE

## 2019-10-10 PROCEDURE — 80061 LIPID PANEL: CPT

## 2019-10-10 RX ORDER — ALBUTEROL SULFATE 90 UG/1
2 AEROSOL, METERED RESPIRATORY (INHALATION) EVERY 6 HOURS PRN
Qty: 18 G | Refills: 1 | Status: SHIPPED | OUTPATIENT
Start: 2019-10-10 | End: 2020-11-09

## 2019-10-10 RX ORDER — CEPHALEXIN 500 MG/1
500 CAPSULE ORAL 3 TIMES DAILY
Qty: 21 CAPSULE | Refills: 1 | Status: SHIPPED | OUTPATIENT
Start: 2019-10-10 | End: 2019-10-17

## 2019-10-10 NOTE — PATIENT INSTRUCTIONS
Monique Olsen's SCREENING SCHEDULE   Tests on this list are recommended by your physician but may not be covered, or covered at this frequency, by your insurer. Please check with your insurance carrier before scheduling to verify coverage.    PREVENTATI who are 73-68 years old and have smoked more than 100 cigarettes in their lifetime   • Anyone with a family history    Colorectal Cancer Screening  Covered up to Age 76     Colonoscopy Screen   Covered every 10 years- more often if abnormal There are no pr are no preventive care reminders to display for this patient.  Please get this Mammogram regularly   Immunizations      Influenza  Covered Annually Orders placed or performed in visit on 10/10/19   • FLU VACC HIGH DOSE PRSV FREE   Orders placed or performed http://www. idph.state. [place]/public/books/advin.htm  A link to the [employer]. This site has a lot of good information including definitions of the different types of Advance Directives.  It also has the State forms available on it's webs

## 2019-10-10 NOTE — PROGRESS NOTES
HPI:   Shannan Mcnair is a 70year old female who presents for a Medicare Subsequent Annual Wellness visit (Pt already had Initial Annual Wellness). Continues to have pain in the right groin. Using Aspercreme. This does help.   It flares up after yo Tobacco Use      Smoking status: Former Smoker      Smokeless tobacco: Never Used         CAGE Alcohol screening   Leandro Meza was screened for Alcohol abuse and had a score of 0 so is at low risk.     Patient Care Team: Patient Care Team:  Marian Brady of arthritis, Personal history of pneumonia (recurrent), and Sprain of lumbar region. She  has a past surgical history that includes cholecystectomy; other surgical history; tonsillectomy; and colonoscopy.     Her family history includes Arthritis in her • >=3 YRS FLUZONE OR FLUARIX QUAD PRESERVE FREE SINGLE DOSE (28713) FLU CLINIC 10/05/2015   • DTAP 04/04/2015   • FLUAD High Dose 65 yr and older (90878) 10/16/2017, 10/16/2018   • FLUZONE 3 Yrs+ Quad Prsv Free 0.5 ml (10172) 10/06/2014, 10/05/2015   • H would be a lot lower than the typical recommended doses of ibuprofen. Diet assessment: good     PLAN:  The patient indicates understanding of these issues and agrees to the plan. Reinforced healthy diet, lifestyle, and exercise.     Return in 6 month flowsheet data found. Glaucoma Screening      Ophthalmology Visit Annually: Diabetics, FHx Glaucoma, AA>50, > 65 No flowsheet data found. Bone Density Screening      Dexascan Every two years No results found for this or any previous visit.  No

## 2019-10-11 ENCOUNTER — PATIENT MESSAGE (OUTPATIENT)
Dept: FAMILY MEDICINE CLINIC | Facility: CLINIC | Age: 79
End: 2019-10-11

## 2019-10-11 NOTE — TELEPHONE ENCOUNTER
From: Ulises Alva  To: Urszula Cutler MD  Sent: 10/11/2019 9:50 AM CDT  Subject: Test Results Question    I see that my cholesterol numbers have all changed since my last test. Although they are all fine as you noted, I was wondering if there was any

## 2019-10-12 DIAGNOSIS — S76.219A GROIN STRAIN, INITIAL ENCOUNTER: ICD-10-CM

## 2019-10-16 NOTE — TELEPHONE ENCOUNTER
called to check status of med refill. It was requested on the 12th.   Can we please call him to let him know when it is sent to the pharmacy

## 2019-10-21 ENCOUNTER — HOSPITAL ENCOUNTER (OUTPATIENT)
Dept: MAMMOGRAPHY | Age: 79
Discharge: HOME | End: 2019-10-21
Attending: FAMILY MEDICINE
Payer: MEDICARE

## 2019-10-21 DIAGNOSIS — Z12.31 ENCOUNTER FOR SCREENING MAMMOGRAM FOR MALIGNANT NEOPLASM OF BREAST: ICD-10-CM

## 2019-10-21 DIAGNOSIS — Z12.39 SCREENING FOR BREAST CANCER: ICD-10-CM

## 2019-10-21 PROCEDURE — 77067 SCR MAMMO BI INCL CAD: CPT | Performed by: FAMILY MEDICINE

## 2019-10-21 PROCEDURE — 77063 BREAST TOMOSYNTHESIS BI: CPT | Performed by: FAMILY MEDICINE

## 2019-12-14 ENCOUNTER — HOSPITAL ENCOUNTER (OUTPATIENT)
Age: 79
Discharge: HOME | End: 2019-12-14
Attending: FAMILY MEDICINE
Payer: MEDICARE

## 2019-12-14 VITALS
RESPIRATION RATE: 18 BRPM | BODY MASS INDEX: 18.38 KG/M2 | TEMPERATURE: 99 F | HEIGHT: 63.5 IN | SYSTOLIC BLOOD PRESSURE: 150 MMHG | DIASTOLIC BLOOD PRESSURE: 81 MMHG | WEIGHT: 105 LBS | OXYGEN SATURATION: 97 % | HEART RATE: 109 BPM

## 2019-12-14 DIAGNOSIS — N30.01 ACUTE CYSTITIS WITH HEMATURIA: Primary | ICD-10-CM

## 2019-12-14 DIAGNOSIS — K52.9 ACUTE GASTROENTERITIS: ICD-10-CM

## 2019-12-14 PROCEDURE — 87077 CULTURE AEROBIC IDENTIFY: CPT | Performed by: FAMILY MEDICINE

## 2019-12-14 PROCEDURE — 87086 URINE CULTURE/COLONY COUNT: CPT | Performed by: FAMILY MEDICINE

## 2019-12-14 PROCEDURE — 81002 URINALYSIS NONAUTO W/O SCOPE: CPT | Performed by: FAMILY MEDICINE

## 2019-12-14 PROCEDURE — 87186 SC STD MICRODIL/AGAR DIL: CPT | Performed by: FAMILY MEDICINE

## 2019-12-14 PROCEDURE — 99214 OFFICE O/P EST MOD 30 MIN: CPT

## 2019-12-14 RX ORDER — CEPHALEXIN 500 MG/1
500 CAPSULE ORAL 3 TIMES DAILY
Qty: 21 CAPSULE | Refills: 0 | Status: SHIPPED | OUTPATIENT
Start: 2019-12-14 | End: 2019-12-21

## 2019-12-14 NOTE — ED INITIAL ASSESSMENT (HPI)
The patient states in the last 24 hours she has had dysuria and increased urgency and frequency. She states she has had many UTIs in the last 3 years. Denies any fevers or other symptoms.

## 2019-12-14 NOTE — ED PROVIDER NOTES
Patient Seen in: [facility]      History   Patient presents with:  Urinary Symptoms    Stated Complaint: Urinary Symptoms x 1 day    HPI    42-year-old female with a history of pneumonia, GERD, and urticaria presents the IC 97%   BMI 18.31 kg/m²         Physical Exam    Gen: Pleasant elderly female in NAD  Head: Normocephalic atraumatic. No sinus tenderness on palpation. Ears: Normal external ear exam. Bilateral TMs are clear. Nose: Bilateral nares are clear.    Mouth: MMM daily for 7 days. , Normal, Disp-21 capsule, R-0

## 2019-12-18 ENCOUNTER — TELEPHONE (OUTPATIENT)
Dept: FAMILY MEDICINE CLINIC | Facility: CLINIC | Age: 79
End: 2019-12-18

## 2019-12-18 DIAGNOSIS — N30.00 ACUTE CYSTITIS WITHOUT HEMATURIA: Primary | ICD-10-CM

## 2019-12-18 NOTE — TELEPHONE ENCOUNTER
Pt is finishing antibiotic this Saturday for a uti. When should she come back to be re evaluated by giving urine to make sure it is gone. Because the lab told her it was caused by ecoli .

## 2019-12-23 ENCOUNTER — APPOINTMENT (OUTPATIENT)
Dept: LAB | Age: 79
End: 2019-12-23
Attending: FAMILY MEDICINE
Payer: MEDICARE

## 2019-12-23 DIAGNOSIS — N30.00 ACUTE CYSTITIS WITHOUT HEMATURIA: ICD-10-CM

## 2019-12-23 PROCEDURE — 87086 URINE CULTURE/COLONY COUNT: CPT

## 2020-01-12 ENCOUNTER — PATIENT MESSAGE (OUTPATIENT)
Dept: FAMILY MEDICINE CLINIC | Facility: CLINIC | Age: 80
End: 2020-01-12

## 2020-01-13 ENCOUNTER — TELEPHONE (OUTPATIENT)
Dept: FAMILY MEDICINE CLINIC | Facility: CLINIC | Age: 80
End: 2020-01-13

## 2020-01-13 RX ORDER — LEVOFLOXACIN 500 MG/1
500 TABLET, FILM COATED ORAL DAILY
Qty: 5 TABLET | Refills: 0 | Status: SHIPPED | OUTPATIENT
Start: 2020-01-13 | End: 2020-01-13

## 2020-01-13 RX ORDER — LEVOFLOXACIN 500 MG/1
500 TABLET, FILM COATED ORAL DAILY
Qty: 5 TABLET | Refills: 0 | Status: SHIPPED | OUTPATIENT
Start: 2020-01-13 | End: 2020-01-18

## 2020-01-13 NOTE — TELEPHONE ENCOUNTER
[facility] [address] [facility] that's on [facility]  It was accidentally sent to [facility]   Pt also has a question regarding the medication.   She states she has a hard time taking meds  Can she split the 500 mg tablet and take 1/2 at a time  Please advi

## 2020-03-09 ENCOUNTER — TELEPHONE (OUTPATIENT)
Dept: FAMILY MEDICINE CLINIC | Facility: CLINIC | Age: 80
End: 2020-03-09

## 2020-04-15 ENCOUNTER — PATIENT MESSAGE (OUTPATIENT)
Dept: FAMILY MEDICINE CLINIC | Facility: CLINIC | Age: 80
End: 2020-04-15

## 2020-04-15 NOTE — TELEPHONE ENCOUNTER
From: Marla Conklin  To: Juni Courtney MD  Sent: 4/15/2020 10:26 AM CDT  Subject: Non-Urgent Medical Question    My overdue tests message page says that I am overdue for Influenza Vaccine, not having one since 10/18.  I had it in your office in Oct. of

## 2020-05-06 RX ORDER — MONTELUKAST SODIUM 10 MG/1
10 TABLET ORAL NIGHTLY
Qty: 90 TABLET | Refills: 1 | Status: SHIPPED | OUTPATIENT
Start: 2020-05-06 | End: 2020-11-05

## 2020-07-16 ENCOUNTER — HOSPITAL ENCOUNTER (OUTPATIENT)
Age: 80
Discharge: HOME | End: 2020-07-16
Payer: MEDICARE

## 2020-07-16 ENCOUNTER — TELEPHONE (OUTPATIENT)
Dept: FAMILY MEDICINE CLINIC | Facility: CLINIC | Age: 80
End: 2020-07-16

## 2020-07-16 VITALS
SYSTOLIC BLOOD PRESSURE: 174 MMHG | BODY MASS INDEX: 18.38 KG/M2 | HEART RATE: 89 BPM | WEIGHT: 105 LBS | TEMPERATURE: 98 F | HEIGHT: 63.5 IN | DIASTOLIC BLOOD PRESSURE: 91 MMHG | OXYGEN SATURATION: 99 % | RESPIRATION RATE: 16 BRPM

## 2020-07-16 DIAGNOSIS — N30.00 ACUTE CYSTITIS WITHOUT HEMATURIA: Primary | ICD-10-CM

## 2020-07-16 LAB
POCT BILIRUBIN URINE: NEGATIVE
POCT GLUCOSE URINE: NEGATIVE MG/DL
POCT KETONE URINE: NEGATIVE MG/DL
POCT NITRITE URINE: NEGATIVE
POCT PH URINE: 6 (ref 5–8)
POCT PROTEIN URINE: NEGATIVE MG/DL
POCT SPECIFIC GRAVITY URINE: 1
POCT URINE CLARITY: CLEAR
POCT URINE COLOR: YELLOW
POCT UROBILINOGEN URINE: 0.2 MG/DL

## 2020-07-16 PROCEDURE — 81002 URINALYSIS NONAUTO W/O SCOPE: CPT | Performed by: NURSE PRACTITIONER

## 2020-07-16 PROCEDURE — 99202 OFFICE O/P NEW SF 15 MIN: CPT | Performed by: NURSE PRACTITIONER

## 2020-07-16 RX ORDER — CEPHALEXIN 500 MG/1
500 CAPSULE ORAL 2 TIMES DAILY
Qty: 14 CAPSULE | Refills: 0 | Status: SHIPPED | OUTPATIENT
Start: 2020-07-16 | End: 2020-10-05

## 2020-07-16 NOTE — TELEPHONE ENCOUNTER
Pt has a bladder infection - asking for urine lab order - no appts avail this week. She states she gets this 2x yrly and Dr. Eddy aware.

## 2020-07-16 NOTE — ED INITIAL ASSESSMENT (HPI)
C/o urinary complaints started yesterday. Today with increased frequency, burning with urination. Pt. Has increased PO intake to help her symptoms.

## 2020-07-16 NOTE — ED PROVIDER NOTES
Patient Seen in: [facility]      History   Patient presents with:  Urinary Symptoms    Stated Complaint: bladder infection x 3day     61-year-old female with a history of UTIs presents emergency department for pain and bur cm (5' 3.5\")   Wt 47.6 kg   SpO2 99%   BMI 18.31 kg/m²         Physical Exam  Vitals signs and nursing note reviewed. Constitutional:       Appearance: Normal appearance. HENT:      Head: Normocephalic.       Nose: Nose normal.      Mouth/Throat: MG Oral Cap  Take 1 capsule (500 mg total) by mouth 2 (two) times daily for 7 days.   Qty: 14 capsule Refills: 0

## 2020-07-16 NOTE — TELEPHONE ENCOUNTER
Spoke to patient - she is having urinary frequency, burning. Denies hematuria, flank pain, or fever. Patient going to Urgent Care.

## 2020-07-19 NOTE — ED NOTES
Attempted to contact the patient regarding neg urine culture results but was unsuccessful. Left a message for the patient to call us back.

## 2020-07-20 ENCOUNTER — TELEPHONE (OUTPATIENT)
Dept: FAMILY MEDICINE CLINIC | Facility: CLINIC | Age: 80
End: 2020-07-20

## 2020-07-20 NOTE — TELEPHONE ENCOUNTER
Pt wants to know if there has been any change in the results from her UTI  She also know how long she should stay on the medication and when Dr. Fredy Casanova wants her to follow up    Please advise

## 2020-07-20 NOTE — TELEPHONE ENCOUNTER
Pt states symptoms better. Advised to finish the antibiotic. To call if symptoms return. Pt verbalized understanding.

## 2020-10-01 ENCOUNTER — TELEPHONE (OUTPATIENT)
Dept: FAMILY MEDICINE CLINIC | Facility: CLINIC | Age: 80
End: 2020-10-01

## 2020-10-01 DIAGNOSIS — Z13.228 SCREENING FOR METABOLIC DISORDER: ICD-10-CM

## 2020-10-01 DIAGNOSIS — Z13.220 SCREENING FOR HYPERCHOLESTEROLEMIA: ICD-10-CM

## 2020-10-01 DIAGNOSIS — E03.8 OTHER SPECIFIED HYPOTHYROIDISM: ICD-10-CM

## 2020-10-01 DIAGNOSIS — Z12.31 ENCOUNTER FOR SCREENING MAMMOGRAM FOR MALIGNANT NEOPLASM OF BREAST: Primary | ICD-10-CM

## 2020-10-01 NOTE — TELEPHONE ENCOUNTER
Pt wants an order for a mammogram    She has her awv on Monday the 5th at 8:00am and wants to get it at the same time    She also wants to get her bloodwork  Please put in the system today if possible

## 2020-10-01 NOTE — TELEPHONE ENCOUNTER
Future appt 10/5/2020 for annual well exam.     10/21/2019  RECOMMENDATIONS:    ROUTINE MAMMOGRAM AND CLINICAL EVALUATION IN 12 MONTHS. Dr. Marine Lenz, please advise on labs. Last lipid/tsh: 10/10/2019. Cmp: 10/16/2018.

## 2020-10-05 ENCOUNTER — OFFICE VISIT (OUTPATIENT)
Dept: FAMILY MEDICINE CLINIC | Facility: CLINIC | Age: 80
End: 2020-10-05
Payer: MEDICARE

## 2020-10-05 ENCOUNTER — LAB ENCOUNTER (OUTPATIENT)
Dept: LAB | Age: 80
End: 2020-10-05
Attending: FAMILY MEDICINE
Payer: MEDICARE

## 2020-10-05 VITALS
BODY MASS INDEX: 18.07 KG/M2 | WEIGHT: 102 LBS | HEART RATE: 91 BPM | DIASTOLIC BLOOD PRESSURE: 76 MMHG | OXYGEN SATURATION: 98 % | SYSTOLIC BLOOD PRESSURE: 134 MMHG | RESPIRATION RATE: 18 BRPM | HEIGHT: 63 IN

## 2020-10-05 DIAGNOSIS — E03.8 OTHER SPECIFIED HYPOTHYROIDISM: ICD-10-CM

## 2020-10-05 DIAGNOSIS — Z23 FLU VACCINE NEED: ICD-10-CM

## 2020-10-05 DIAGNOSIS — J45.20 MILD INTERMITTENT ASTHMA WITHOUT COMPLICATION: ICD-10-CM

## 2020-10-05 DIAGNOSIS — Z00.00 ENCOUNTER FOR ANNUAL HEALTH EXAMINATION: Primary | ICD-10-CM

## 2020-10-05 DIAGNOSIS — Z13.228 SCREENING FOR METABOLIC DISORDER: ICD-10-CM

## 2020-10-05 DIAGNOSIS — I34.1 MITRAL VALVE PROLAPSE DETERMINED BY IMAGING: ICD-10-CM

## 2020-10-05 DIAGNOSIS — Z13.220 SCREENING FOR HYPERCHOLESTEROLEMIA: ICD-10-CM

## 2020-10-05 DIAGNOSIS — J30.89 SEASONAL ALLERGIC RHINITIS DUE TO OTHER ALLERGIC TRIGGER: ICD-10-CM

## 2020-10-05 PROCEDURE — 80061 LIPID PANEL: CPT

## 2020-10-05 PROCEDURE — G0439 PPPS, SUBSEQ VISIT: HCPCS | Performed by: FAMILY MEDICINE

## 2020-10-05 PROCEDURE — 90662 IIV NO PRSV INCREASED AG IM: CPT | Performed by: FAMILY MEDICINE

## 2020-10-05 PROCEDURE — 36415 COLL VENOUS BLD VENIPUNCTURE: CPT

## 2020-10-05 PROCEDURE — 84439 ASSAY OF FREE THYROXINE: CPT

## 2020-10-05 PROCEDURE — G0008 ADMIN INFLUENZA VIRUS VAC: HCPCS | Performed by: FAMILY MEDICINE

## 2020-10-05 PROCEDURE — 84443 ASSAY THYROID STIM HORMONE: CPT

## 2020-10-05 PROCEDURE — 80053 COMPREHEN METABOLIC PANEL: CPT

## 2020-10-05 RX ORDER — CEPHALEXIN 500 MG/1
500 CAPSULE ORAL 2 TIMES DAILY
Qty: 14 CAPSULE | Refills: 1 | Status: SHIPPED | OUTPATIENT
Start: 2020-10-05 | End: 2020-10-12

## 2020-10-05 NOTE — PATIENT INSTRUCTIONS
CeraVe lotion for itch    Voltaren gel to hip. Monique Olsen's SCREENING SCHEDULE   Tests on this list are recommended by your physician but may not be covered, or covered at this frequency, by your insurer.  Please check with your insurance carrier patients who meet one of the following criteria:   • Men who are 73-68 years old and have smoked more than 100 cigarettes in their lifetime   • Anyone with a family history    Colorectal Cancer Screening  Covered up to Age 76     Colonoscopy Screen   Cover Annually to at least age 76, and as needed after 76 There are no preventive care reminders to display for this patient.  Please get this Mammogram regularly   Immunizations      Influenza  Covered Annually Orders placed or performed in visit on 10/05/20   • Directives    [email or web address] org/publications/[email or web address]/personal_[email or web address]. pdf  An information packet, including necessary form from the [employer] website. http://[email or web address] [place]ph.state. [place]/public/books/advin.htm  A link to the [place]

## 2020-10-05 NOTE — PROGRESS NOTES
HPI:   Cherelle Brooks is a 70year old female who presents for a Medicare Subsequent Annual Wellness visit (Pt already had Initial Annual Wellness).       Annual Physical due on 10/05/2021        Fall/Risk Assessment   She has been screened for Falls and (46.7 kg)  07/16/20 : 105 lb (47.6 kg)     Last Cholesterol Labs:   Lab Results   Component Value Date    CHOLEST 183 10/10/2019    HDL 62 (H) 10/10/2019     (H) 10/10/2019    TRIG 62 10/10/2019          Last Chemistry Labs:   Lab Results   Componen prostate disorders in her father. SOCIAL HISTORY:   She  reports that she has quit smoking. She has never used smokeless tobacco. She reports current alcohol use. She reports that she does not use drugs.      REVIEW OF SYSTEMS:   GENERAL: feels well other enlarged, symmetric, no tenderness/mass/nodules; no carotid bruit or JVD       Lungs:   Clear to auscultation bilaterally, respirations unlabored   Heart:  Regular rate and rhythm, S1 and S2 normal, no murmur, rub, or gallop   Abdomen:   Soft, non-tender, Flonase  Mild intermittent asthma without complication  As needed albuterol. Rare use. Mitral valve prolapse determined by imaging  Reviewed visit with cardiologist from August 10. Echo with likely next year.   Flu vaccine need  -     FLU VACC HIGH DOSE Colorectal Cancer Screening      Colonoscopy Screen every 10 years There are no preventive care reminders to display for this patient.  Update Health Maintenance if applicable    Flex Sigmoidoscopy Screen every 10 years No results found for this or any pr Tetanus Toxoid  Only covered with a cut with metal- TD and TDaP Not covered by Medicare Part B 07/11/2005 This may be covered with your prescription benefits, but Medicare does not cover unless Medically needed    Zoster  Not covered by Medicare Part B No

## 2020-10-23 ENCOUNTER — HOSPITAL ENCOUNTER (OUTPATIENT)
Dept: MAMMOGRAPHY | Age: 80
Discharge: HOME | End: 2020-10-23
Attending: FAMILY MEDICINE
Payer: MEDICARE

## 2020-10-23 DIAGNOSIS — Z12.31 ENCOUNTER FOR SCREENING MAMMOGRAM FOR MALIGNANT NEOPLASM OF BREAST: ICD-10-CM

## 2020-10-23 PROCEDURE — 77063 BREAST TOMOSYNTHESIS BI: CPT | Performed by: FAMILY MEDICINE

## 2020-10-23 PROCEDURE — 77067 SCR MAMMO BI INCL CAD: CPT | Performed by: FAMILY MEDICINE

## 2020-11-02 ENCOUNTER — TELEPHONE (OUTPATIENT)
Dept: FAMILY MEDICINE CLINIC | Facility: CLINIC | Age: 80
End: 2020-11-02

## 2020-11-02 NOTE — TELEPHONE ENCOUNTER
Pt states she is having UTI symptoms, burning, urgency and some pressure. Pt is wondering if she can just take the keflex you gave her at her appointment in October? Or do you want UA/culture?

## 2020-11-02 NOTE — TELEPHONE ENCOUNTER
No nurse appt available. Pt informed to start keflex and follow up in 2 days if not better. Pt verbalized understanding.

## 2020-11-02 NOTE — TELEPHONE ENCOUNTER
pls call to discuss some UTI sx - pt said last time she was in Dr. Rodriguez Drilling gave her med she could take for this and she wants to know if it's okay to take now

## 2020-11-02 NOTE — TELEPHONE ENCOUNTER
As you can get a urine sample to verify UTI that would be best.  If there is no nurse appointments later today, have her start the Keflex. Have her follow-up if her symptoms do not improve in 2 days.

## 2020-11-05 RX ORDER — MONTELUKAST SODIUM 10 MG/1
TABLET ORAL
Qty: 90 TABLET | Refills: 1 | Status: SHIPPED | OUTPATIENT
Start: 2020-11-05 | End: 2021-11-08

## 2020-11-09 DIAGNOSIS — J45.20 MILD INTERMITTENT ASTHMA WITHOUT COMPLICATION: ICD-10-CM

## 2020-11-09 RX ORDER — ALBUTEROL SULFATE 90 UG/1
2 AEROSOL, METERED RESPIRATORY (INHALATION) EVERY 6 HOURS PRN
Qty: 18 G | Refills: 1 | Status: SHIPPED | OUTPATIENT
Start: 2020-11-09 | End: 2020-11-11

## 2020-11-11 ENCOUNTER — TELEPHONE (OUTPATIENT)
Dept: FAMILY MEDICINE CLINIC | Facility: CLINIC | Age: 80
End: 2020-11-11

## 2020-11-11 DIAGNOSIS — J45.20 MILD INTERMITTENT ASTHMA WITHOUT COMPLICATION: ICD-10-CM

## 2020-11-11 RX ORDER — ALBUTEROL SULFATE 90 UG/1
2 AEROSOL, METERED RESPIRATORY (INHALATION) EVERY 6 HOURS PRN
Qty: 18 G | Refills: 1 | Status: SHIPPED | OUTPATIENT
Start: 2020-11-11

## 2020-11-11 NOTE — TELEPHONE ENCOUNTER
Albuterol Sulfate HFA (VENTOLIN HFA) 108 (90 Base) MCG/ACT Inhalation Aero Soln       WAS SUPPOSE TO GO TO [employer] MAIL ORDER. PLEASE CORRECT IT.

## 2020-12-10 ENCOUNTER — TELEPHONE (OUTPATIENT)
Dept: FAMILY MEDICINE CLINIC | Facility: CLINIC | Age: 80
End: 2020-12-10

## 2020-12-10 DIAGNOSIS — E03.8 OTHER SPECIFIED HYPOTHYROIDISM: Primary | ICD-10-CM

## 2020-12-10 NOTE — TELEPHONE ENCOUNTER
[facility] also called regarding this med. This med is on a long-term backorder.   [facility] does not have an alternative med and requesting that if Dr. Cristian Ambrosio chooses any other drug to replace nature-throid to send to a different pharmacy and N

## 2020-12-10 NOTE — TELEPHONE ENCOUNTER
Pt was told by pharmacy they will no longer have the nature-throid - pt is almost out and asking what Dr. Rahat Dinh will replace this med with - she needs a new script now

## 2020-12-10 NOTE — TELEPHONE ENCOUNTER
I would suggest levothyroxine 50 mcg daily. Please confirm which pharmacy she would like it sent to.

## 2020-12-11 RX ORDER — LEVOTHYROXINE SODIUM 0.05 MG/1
50 TABLET ORAL
Qty: 60 TABLET | Refills: 0 | Status: SHIPPED | OUTPATIENT
Start: 2020-12-11 | End: 2021-02-03

## 2021-01-22 ENCOUNTER — LAB ENCOUNTER (OUTPATIENT)
Dept: LAB | Age: 81
End: 2021-01-22
Attending: FAMILY MEDICINE
Payer: MEDICARE

## 2021-01-22 DIAGNOSIS — E03.8 OTHER SPECIFIED HYPOTHYROIDISM: ICD-10-CM

## 2021-01-22 LAB — TSI SER-ACNC: 1.85 MIU/ML (ref 0.36–3.74)

## 2021-01-22 PROCEDURE — 84443 ASSAY THYROID STIM HORMONE: CPT

## 2021-01-22 PROCEDURE — 36415 COLL VENOUS BLD VENIPUNCTURE: CPT

## 2021-02-03 RX ORDER — LEVOTHYROXINE SODIUM 0.05 MG/1
50 TABLET ORAL
Qty: 30 TABLET | Refills: 1 | Status: SHIPPED | OUTPATIENT
Start: 2021-02-03 | End: 2021-04-02

## 2021-03-13 DIAGNOSIS — Z23 NEED FOR VACCINATION: ICD-10-CM

## 2021-04-01 ENCOUNTER — PATIENT MESSAGE (OUTPATIENT)
Dept: FAMILY MEDICINE CLINIC | Facility: CLINIC | Age: 81
End: 2021-04-01

## 2021-04-02 RX ORDER — LEVOTHYROXINE SODIUM 0.05 MG/1
50 TABLET ORAL
Qty: 30 TABLET | Refills: 1 | Status: SHIPPED | OUTPATIENT
Start: 2021-04-02 | End: 2021-05-28

## 2021-04-02 NOTE — TELEPHONE ENCOUNTER
From: Dea Olvera  To: Steph Harvey MD  Sent: 4/1/2021 6:20 PM CDT  Subject: Non-Urgent Medical Question    I received two messages stating I should schedule a Covid shot.  Received both 1st and 2nd Pfizer shots through [facility], the first shot on

## 2021-04-12 ENCOUNTER — PATIENT MESSAGE (OUTPATIENT)
Dept: FAMILY MEDICINE CLINIC | Facility: CLINIC | Age: 81
End: 2021-04-12

## 2021-04-12 NOTE — TELEPHONE ENCOUNTER
From: Tanya Pereira  To: Estevan Ambrosio MD  Sent: 4/12/2021 1:33 PM CDT  Subject: Non-Urgent Medical Question    After taking a walk on Friday when it was very windy, I experienced head hurting and my right ear felt like it had water in it.  On Saturday,

## 2021-04-15 ENCOUNTER — TELEMEDICINE (OUTPATIENT)
Dept: FAMILY MEDICINE CLINIC | Facility: CLINIC | Age: 81
End: 2021-04-15

## 2021-04-15 DIAGNOSIS — J30.89 SEASONAL ALLERGIC RHINITIS DUE TO OTHER ALLERGIC TRIGGER: ICD-10-CM

## 2021-04-15 DIAGNOSIS — J01.00 ACUTE NON-RECURRENT MAXILLARY SINUSITIS: Primary | ICD-10-CM

## 2021-04-15 PROCEDURE — 99213 OFFICE O/P EST LOW 20 MIN: CPT | Performed by: FAMILY MEDICINE

## 2021-04-15 RX ORDER — CEPHALEXIN 500 MG/1
500 CAPSULE ORAL 3 TIMES DAILY
Qty: 30 CAPSULE | Refills: 0 | Status: SHIPPED | OUTPATIENT
Start: 2021-04-15 | End: 2021-04-25

## 2021-04-15 NOTE — PROGRESS NOTES
Virtual video Check-In    Leon Sykes verbally consents to a Virtual/Telephone Check-In visit on 04/15/21. Patient understands and accepts financial responsibility for any deductible, co-insurance and/or co-pays associated with this service.     Du [Altaryl], Codeine [Opioid Analgesics], Erythromycin, Naproxen, and Penicillins  FAMILY HISTORY  Family History   Problem Relation Age of Onset   • Other (prostate disorders) Father    • Cancer Mother 68        breast   • Breast Cancer Mother         pt no reviewing labs, medications, radiology tests and decision making. Appropriate medical decision-making and tests are ordered as detailed in the plan of care above.

## 2021-05-07 ENCOUNTER — HOSPITAL ENCOUNTER (OUTPATIENT)
Age: 81
Discharge: HOME | End: 2021-05-07
Payer: MEDICARE

## 2021-05-07 VITALS
BODY MASS INDEX: 17.85 KG/M2 | WEIGHT: 102 LBS | HEART RATE: 99 BPM | HEIGHT: 63.5 IN | DIASTOLIC BLOOD PRESSURE: 88 MMHG | SYSTOLIC BLOOD PRESSURE: 157 MMHG | TEMPERATURE: 99 F | RESPIRATION RATE: 16 BRPM

## 2021-05-07 DIAGNOSIS — N30.01 ACUTE CYSTITIS WITH HEMATURIA: ICD-10-CM

## 2021-05-07 DIAGNOSIS — R82.998 LEUKOCYTES IN URINE: Primary | ICD-10-CM

## 2021-05-07 PROCEDURE — 99214 OFFICE O/P EST MOD 30 MIN: CPT | Performed by: PHYSICIAN ASSISTANT

## 2021-05-07 PROCEDURE — 81002 URINALYSIS NONAUTO W/O SCOPE: CPT | Performed by: PHYSICIAN ASSISTANT

## 2021-05-07 RX ORDER — CEPHALEXIN 500 MG/1
500 CAPSULE ORAL 2 TIMES DAILY
Qty: 14 CAPSULE | Refills: 0 | Status: SHIPPED | OUTPATIENT
Start: 2021-05-07 | End: 2021-05-14

## 2021-05-07 RX ORDER — PHENAZOPYRIDINE HYDROCHLORIDE 100 MG/1
100 TABLET, FILM COATED ORAL 3 TIMES DAILY PRN
Qty: 6 TABLET | Refills: 0 | Status: SHIPPED | OUTPATIENT
Start: 2021-05-07 | End: 2021-05-09

## 2021-05-07 NOTE — ED INITIAL ASSESSMENT (HPI)
Patient states she has had dysuria, urgency and frequency, and some bladder pain. She has had chills but denies any fevers, hematuria or any other symptoms.  She has used monistat OTC for 3 days for a yeast infection, as she was taking cephalexin 500mg PO T

## 2021-05-07 NOTE — ED PROVIDER NOTES
Patient Seen in: [facility] [address]      History   Patient presents with:  Urinary Symptoms    Stated Complaint: Urinary Concerns    HPI/Subjective:   HPI    CHIEF COMPLAINT: Dysuria, urinary tract infection    HISTORY OF PRESENT ILLNESS: Pa surgery   • TONSILLECTOMY                  Social History    Tobacco Use      Smoking status: Former Smoker      Smokeless tobacco: Never Used    Vaping Use      Vaping Use: Never used    Alcohol use: Yes    Drug use:  No             Review of Systems    Po reviewed the patient's past charts and looked at her to past urinary tract infections and the past year and a half and did see that the patient did have E. coli in the past which was susceptible to cephalexin.   Patient will be started on cephalexin 500 mg medications    cephALEXin 500 MG Oral Cap  Take 1 capsule (500 mg total) by mouth 2 (two) times daily for 7 days. , Normal, Disp-14 capsule, R-0    Phenazopyridine HCl 100 MG Oral Tab  Take 1 tablet (100 mg total) by mouth 3 (three) times daily as needed fo

## 2021-05-17 ENCOUNTER — OFFICE VISIT (OUTPATIENT)
Dept: FAMILY MEDICINE CLINIC | Facility: CLINIC | Age: 81
End: 2021-05-17
Payer: MEDICARE

## 2021-05-17 VITALS
RESPIRATION RATE: 16 BRPM | SYSTOLIC BLOOD PRESSURE: 140 MMHG | OXYGEN SATURATION: 98 % | HEIGHT: 63.5 IN | HEART RATE: 92 BPM | DIASTOLIC BLOOD PRESSURE: 78 MMHG | WEIGHT: 104 LBS | BODY MASS INDEX: 18.2 KG/M2

## 2021-05-17 DIAGNOSIS — N89.8 VAGINAL DRYNESS: ICD-10-CM

## 2021-05-17 DIAGNOSIS — H61.21 HEARING LOSS DUE TO CERUMEN IMPACTION, RIGHT: ICD-10-CM

## 2021-05-17 DIAGNOSIS — L90.5 SCAR: ICD-10-CM

## 2021-05-17 DIAGNOSIS — R30.0 DYSURIA: Primary | ICD-10-CM

## 2021-05-17 DIAGNOSIS — N30.00 ACUTE CYSTITIS WITHOUT HEMATURIA: ICD-10-CM

## 2021-05-17 PROCEDURE — 69210 REMOVE IMPACTED EAR WAX UNI: CPT | Performed by: FAMILY MEDICINE

## 2021-05-17 PROCEDURE — 99213 OFFICE O/P EST LOW 20 MIN: CPT | Performed by: FAMILY MEDICINE

## 2021-05-17 PROCEDURE — 81003 URINALYSIS AUTO W/O SCOPE: CPT | Performed by: FAMILY MEDICINE

## 2021-05-17 NOTE — PROGRESS NOTES
Follow-up from urgent care for UTI. Culture grew Klebsiella pneumonia. Was susceptible to the cefuroxime she was prescribed. She is doing better. Is using Cerumenex 3 days each month. Has some decreased hearing in the right ear.   Feels like it is fu Mother 68        breast   • Breast Cancer Mother         pt not sure if her mother had breast ca or not, she thinks so though   • Arthritis Mother    • Other (allergies) Daughter    • Other (allergies) Son    • Arthritis Maternal Grandmother    • Stroke Ne

## 2021-05-19 ENCOUNTER — TELEPHONE (OUTPATIENT)
Dept: FAMILY MEDICINE CLINIC | Facility: CLINIC | Age: 81
End: 2021-05-19

## 2021-05-19 DIAGNOSIS — N30.00 ACUTE CYSTITIS WITHOUT HEMATURIA: Primary | ICD-10-CM

## 2021-05-19 RX ORDER — CIPROFLOXACIN 500 MG/1
500 TABLET, FILM COATED ORAL 2 TIMES DAILY
Qty: 14 TABLET | Refills: 0 | Status: SHIPPED | OUTPATIENT
Start: 2021-05-19 | End: 2021-08-17 | Stop reason: ALTCHOICE

## 2021-05-19 NOTE — TELEPHONE ENCOUNTER
Spoke to pt she is c/o urinary frequency and dysuria. Pt had ov 5/17/21 with Dr Juliette Hobbs. Pt finished Cephalexin last week for UTI. Please advise.

## 2021-05-19 NOTE — TELEPHONE ENCOUNTER
Pt saw Dr. Cristian Ambrosio on Monday for a UTI    She was doing better and now she is having symptoms again today when she urinated    Please advise

## 2021-05-19 NOTE — TELEPHONE ENCOUNTER
Urine culture from the urgent care reviewed. Klebsiella was most sensitive to Cipro. I have therefore sent a prescription for Cipro 500 mg twice a day for 7 days. Ask her to remain on the probiotic during the antibiotic and for an additional 7 days.

## 2021-05-28 RX ORDER — LEVOTHYROXINE SODIUM 0.05 MG/1
50 TABLET ORAL
Qty: 30 TABLET | Refills: 1 | Status: SHIPPED | OUTPATIENT
Start: 2021-05-28 | End: 2021-06-24

## 2021-05-28 NOTE — TELEPHONE ENCOUNTER
Rx Request  LEVOTHYROXINE SODIUM 50 MCG Oral Tab    Disp:      30              R: 1     Last Refilled: 04/02/2021    Last Visit: 05/17/2021    Protocol Passed?  Yes[ x]       No[  ]

## 2021-06-23 ENCOUNTER — OFFICE VISIT (OUTPATIENT)
Dept: FAMILY MEDICINE CLINIC | Facility: CLINIC | Age: 81
End: 2021-06-23
Payer: MEDICARE

## 2021-06-23 VITALS
RESPIRATION RATE: 18 BRPM | OXYGEN SATURATION: 98 % | WEIGHT: 104 LBS | DIASTOLIC BLOOD PRESSURE: 70 MMHG | BODY MASS INDEX: 18.2 KG/M2 | HEART RATE: 71 BPM | HEIGHT: 63.5 IN | SYSTOLIC BLOOD PRESSURE: 126 MMHG

## 2021-06-23 DIAGNOSIS — L82.1 SEBORRHEIC KERATOSIS: ICD-10-CM

## 2021-06-23 DIAGNOSIS — L90.5 SCAR: Primary | ICD-10-CM

## 2021-06-23 PROCEDURE — 99213 OFFICE O/P EST LOW 20 MIN: CPT | Performed by: FAMILY MEDICINE

## 2021-06-23 NOTE — PROGRESS NOTES
Still has a red area on the right side of her neck. Thought she might have burned it with a curling iron. Also considered chemical burn because her hair touches this area. No better with Neosporin. Check lesions on her upper and lower left back.   No fa pt not sure if her mother had breast ca or not, she thinks so though   • Arthritis Mother    • Other (allergies) Daughter    • Other (allergies) Son    • Arthritis Maternal Grandmother    • Stroke Neg    • Heart Disease Neg        PHYSICAL EXAM:  BP

## 2021-06-24 RX ORDER — LEVOTHYROXINE SODIUM 0.05 MG/1
50 TABLET ORAL
Qty: 30 TABLET | Refills: 1 | Status: SHIPPED | OUTPATIENT
Start: 2021-06-24 | End: 2021-07-20

## 2021-06-24 NOTE — TELEPHONE ENCOUNTER
Rx Request  LEVOTHYROXINE SODIUM 50 MCG Oral Tab    Disp:      30              R: 1    Last Refilled: 05/28/2021     Last Visit: 06/24/21      Protocol Passed?  Yes[ x ]       No[  ]

## 2021-07-20 RX ORDER — LEVOTHYROXINE SODIUM 0.05 MG/1
50 TABLET ORAL
Qty: 30 TABLET | Refills: 1 | Status: SHIPPED | OUTPATIENT
Start: 2021-07-20 | End: 2021-08-13

## 2021-08-05 ENCOUNTER — HOSPITAL ENCOUNTER (OUTPATIENT)
Age: 81
Discharge: HOME | End: 2021-08-05
Payer: MEDICARE

## 2021-08-05 VITALS
RESPIRATION RATE: 20 BRPM | SYSTOLIC BLOOD PRESSURE: 154 MMHG | TEMPERATURE: 99 F | DIASTOLIC BLOOD PRESSURE: 88 MMHG | OXYGEN SATURATION: 98 % | HEART RATE: 87 BPM

## 2021-08-05 DIAGNOSIS — N30.00 ACUTE CYSTITIS WITHOUT HEMATURIA: Primary | ICD-10-CM

## 2021-08-05 LAB
POCT BILIRUBIN URINE: NEGATIVE
POCT GLUCOSE URINE: NEGATIVE MG/DL
POCT KETONE URINE: NEGATIVE MG/DL
POCT NITRITE URINE: NEGATIVE
POCT PH URINE: 6 (ref 5–8)
POCT PROTEIN URINE: NEGATIVE MG/DL
POCT SPECIFIC GRAVITY URINE: 1
POCT URINE COLOR: YELLOW
POCT UROBILINOGEN URINE: 0.2 MG/DL

## 2021-08-05 PROCEDURE — 99213 OFFICE O/P EST LOW 20 MIN: CPT | Performed by: PHYSICIAN ASSISTANT

## 2021-08-05 PROCEDURE — 81002 URINALYSIS NONAUTO W/O SCOPE: CPT | Performed by: PHYSICIAN ASSISTANT

## 2021-08-05 RX ORDER — PHENAZOPYRIDINE HYDROCHLORIDE 200 MG/1
200 TABLET, FILM COATED ORAL 3 TIMES DAILY PRN
Qty: 6 TABLET | Refills: 0 | Status: SHIPPED | OUTPATIENT
Start: 2021-08-05 | End: 2021-08-12

## 2021-08-05 RX ORDER — CEPHALEXIN 500 MG/1
500 CAPSULE ORAL 2 TIMES DAILY
Qty: 14 CAPSULE | Refills: 0 | Status: SHIPPED | OUTPATIENT
Start: 2021-08-05 | End: 2021-08-12

## 2021-08-05 NOTE — ED PROVIDER NOTES
Patient Seen in: [facility] [address] [place] [address]      History   Patient presents with:  Urinary Symptoms    Stated Complaint: UTI    HPI/Subjective:   HPI    72-year-old female who comes in today with known history of frequent urinary tract infection she respirations unlabored, no wheezing, rales or rhonchi   Heart:  Regular rate & rhythm, S1 and S2 normal, no murmurs, rubs, or gallops  Abdomen:  Soft, mild suprapubic tenderness, bowel sounds active all four quadrants, no mass or organomegaly.  No rebound t [id number]  [phone]    Schedule an appointment as soon as possible for a visit   If symptoms worsen          Medications Prescribed:  Discharge Medication List as of 8/5/2021 12:36 PM    START taking these medications    cephalexin 500 MG Oral Cap  Take 1

## 2021-08-13 RX ORDER — LEVOTHYROXINE SODIUM 0.05 MG/1
50 TABLET ORAL
Qty: 30 TABLET | Refills: 1 | Status: SHIPPED | OUTPATIENT
Start: 2021-08-13 | End: 2021-09-08

## 2021-08-17 ENCOUNTER — OFFICE VISIT (OUTPATIENT)
Dept: FAMILY MEDICINE CLINIC | Facility: CLINIC | Age: 81
End: 2021-08-17
Payer: MEDICARE

## 2021-08-17 VITALS
WEIGHT: 104 LBS | BODY MASS INDEX: 18.2 KG/M2 | RESPIRATION RATE: 16 BRPM | OXYGEN SATURATION: 98 % | DIASTOLIC BLOOD PRESSURE: 58 MMHG | HEART RATE: 83 BPM | HEIGHT: 63.5 IN | SYSTOLIC BLOOD PRESSURE: 116 MMHG

## 2021-08-17 DIAGNOSIS — R39.9 URINARY SYMPTOM OR SIGN: ICD-10-CM

## 2021-08-17 DIAGNOSIS — N39.0 RECURRENT UTI: Primary | ICD-10-CM

## 2021-08-17 LAB
BILIRUBIN: NEGATIVE
GLUCOSE (URINE DIPSTICK): NEGATIVE MG/DL
KETONES (URINE DIPSTICK): NEGATIVE MG/DL
MULTISTIX LOT#: 5077 NUMERIC
NITRITE, URINE: NEGATIVE
PH, URINE: 6.5 (ref 4.5–8)
PROTEIN (URINE DIPSTICK): NEGATIVE MG/DL
SPECIFIC GRAVITY: 1 (ref 1–1.03)
UROBILINOGEN,SEMI-QN: 0.2 MG/DL (ref 0–1.9)

## 2021-08-17 PROCEDURE — 99213 OFFICE O/P EST LOW 20 MIN: CPT | Performed by: FAMILY MEDICINE

## 2021-08-17 PROCEDURE — 81003 URINALYSIS AUTO W/O SCOPE: CPT | Performed by: FAMILY MEDICINE

## 2021-08-17 RX ORDER — PHENAZOPYRIDINE HYDROCHLORIDE 100 MG/1
100 TABLET, FILM COATED ORAL 3 TIMES DAILY PRN
Qty: 15 TABLET | Refills: 1 | Status: SHIPPED | OUTPATIENT
Start: 2021-08-17

## 2021-08-17 RX ORDER — CEPHALEXIN 500 MG/1
500 CAPSULE ORAL 2 TIMES DAILY
Qty: 10 CAPSULE | Refills: 1 | Status: SHIPPED | OUTPATIENT
Start: 2021-08-17 | End: 2021-08-22

## 2021-08-17 NOTE — PROGRESS NOTES
Had a UTI back in April. Treated with Keflex. A follow-up culture grew Klebsiella and we put her on Cipro which she does not appreciate. She has a penicillin allergy. Was in the urgent care earlier this month.   Moderate leukocyte esterase and moderat Solution 1 spray by Nasal route 2 (two) times daily.  30 mL 2     ALLERGIES:   Advil [Ibuprofen], Aspirin, Benadryl [Altaryl], Codeine [Opioid Analgesics], Erythromycin, Naproxen, and Penicillins  FAMILY HISTORY  Family History   Problem Relation Age of Ons

## 2021-09-08 ENCOUNTER — PATIENT MESSAGE (OUTPATIENT)
Dept: FAMILY MEDICINE CLINIC | Facility: CLINIC | Age: 81
End: 2021-09-08

## 2021-09-08 RX ORDER — LEVOTHYROXINE SODIUM 0.05 MG/1
50 TABLET ORAL
Qty: 30 TABLET | Refills: 1 | Status: SHIPPED | OUTPATIENT
Start: 2021-09-08 | End: 2021-10-05

## 2021-09-08 NOTE — TELEPHONE ENCOUNTER
From: Deepa Garcia  To: Saroj Chavez MD  Sent: 9/8/2021 12:01 PM CDT  Subject: Non-Urgent Medical Question    Message says that a third shot of vaccine is available to those that qualify. I did not think this was approved as yet.  I am confused as to

## 2021-10-05 RX ORDER — LEVOTHYROXINE SODIUM 0.05 MG/1
50 TABLET ORAL
Qty: 30 TABLET | Refills: 1 | Status: SHIPPED | OUTPATIENT
Start: 2021-10-05 | End: 2021-10-29

## 2021-10-11 ENCOUNTER — OFFICE VISIT (OUTPATIENT)
Dept: FAMILY MEDICINE CLINIC | Facility: CLINIC | Age: 81
End: 2021-10-11
Payer: MEDICARE

## 2021-10-11 ENCOUNTER — LAB ENCOUNTER (OUTPATIENT)
Dept: LAB | Age: 81
End: 2021-10-11
Attending: FAMILY MEDICINE
Payer: MEDICARE

## 2021-10-11 VITALS
OXYGEN SATURATION: 98 % | RESPIRATION RATE: 20 BRPM | DIASTOLIC BLOOD PRESSURE: 62 MMHG | SYSTOLIC BLOOD PRESSURE: 120 MMHG | WEIGHT: 102.63 LBS | BODY MASS INDEX: 18.18 KG/M2 | HEART RATE: 83 BPM | HEIGHT: 63 IN

## 2021-10-11 DIAGNOSIS — Z00.00 ENCOUNTER FOR ANNUAL HEALTH EXAMINATION: Primary | ICD-10-CM

## 2021-10-11 DIAGNOSIS — Z00.00 ENCOUNTER FOR ANNUAL HEALTH EXAMINATION: ICD-10-CM

## 2021-10-11 DIAGNOSIS — E03.8 OTHER SPECIFIED HYPOTHYROIDISM: ICD-10-CM

## 2021-10-11 DIAGNOSIS — Z12.31 ENCOUNTER FOR SCREENING MAMMOGRAM FOR MALIGNANT NEOPLASM OF BREAST: ICD-10-CM

## 2021-10-11 DIAGNOSIS — Z23 FLU VACCINE NEED: ICD-10-CM

## 2021-10-11 DIAGNOSIS — Z13.6 SCREENING FOR CARDIOVASCULAR CONDITION: ICD-10-CM

## 2021-10-11 PROCEDURE — G0439 PPPS, SUBSEQ VISIT: HCPCS | Performed by: FAMILY MEDICINE

## 2021-10-11 PROCEDURE — 36415 COLL VENOUS BLD VENIPUNCTURE: CPT

## 2021-10-11 PROCEDURE — 80061 LIPID PANEL: CPT

## 2021-10-11 PROCEDURE — G0008 ADMIN INFLUENZA VIRUS VAC: HCPCS | Performed by: FAMILY MEDICINE

## 2021-10-11 PROCEDURE — 80053 COMPREHEN METABOLIC PANEL: CPT

## 2021-10-11 PROCEDURE — 84443 ASSAY THYROID STIM HORMONE: CPT

## 2021-10-11 PROCEDURE — 90662 IIV NO PRSV INCREASED AG IM: CPT | Performed by: FAMILY MEDICINE

## 2021-10-11 NOTE — PATIENT INSTRUCTIONS
Monique Olsen's SCREENING SCHEDULE   Tests on this list are recommended by your physician but may not be covered, or covered at this frequency, by your insurer. Please check with your insurance carrier before scheduling to verify coverage.    Lynell Fabry Pap and Pelvic    Pap   Covered every 2 years for women at normal risk;  Annually if at high risk -  No recommendations at this time    Chlamydia Annually if high risk -  No recommendations at this time   Screening Mammogram    Mammogram     Recommend [email or web address]

## 2021-10-11 NOTE — PROGRESS NOTES
HPI:   Olive Sepulveda is a 68year old female who presents for a Medicare Subsequent Annual Wellness visit (Pt already had Initial Annual Wellness). Annual Physical due on 10/11/2022        She has been screened for Falls and is low risk.     Cognit 10/05/2020    TRIG 75 10/05/2020          Last Chemistry Labs:   Lab Results   Component Value Date    AST 22 10/05/2020    ALT 23 10/05/2020    CA 9.2 10/05/2020    ALB 3.7 10/05/2020    TSH 1.850 01/22/2021    CREATSERUM 0.72 10/05/2020    GLU 96 10/05/2 3\" (1.6 m)   Wt 102 lb 9.6 oz (46.5 kg)   SpO2 98%   BMI 18.17 kg/m²  Estimated body mass index is 18.17 kg/m² as calculated from the following:    Height as of this encounter: 5' 3\" (1.6 m). Weight as of this encounter: 102 lb 9.6 oz (46.5 kg).     Me visit:    Encounter for annual health examination  -     LIPID PANEL; Future  -     COMP METABOLIC PANEL (14);  Future  -     TSH W REFLEX TO FREE T4; Future  Covid booster next week  Other specified hypothyroidism  -     TSH W REFLEX TO FREE T4; Future  On Component Value Date    GLU 96 10/05/2020        Cardiovascular Disease Screening    Lipid Panel  Cholesterol  Lipoprotein (HDL)  Triglycerides Covered every 5 years for all Medicare beneficiaries without apparent signs or symptoms of cardiovascular dise ([id number] & [id number]) covered once after 65 Prevnar 13: 10/16/2017    [id number]: 10/07/2008     No recommendations at this time    Hepatitis B One screening covered for patients with certain risk factors   -  No recommendations at this time    Cleo

## 2021-10-18 ENCOUNTER — IMMUNIZATION (OUTPATIENT)
Dept: LAB | Facility: HOSPITAL | Age: 81
End: 2021-10-18
Attending: EMERGENCY MEDICINE
Payer: MEDICARE

## 2021-10-18 DIAGNOSIS — Z23 NEED FOR VACCINATION: Primary | ICD-10-CM

## 2021-10-18 PROCEDURE — 0003A SARSCOV2 VAC 30MCG/0.3ML IM: CPT

## 2021-10-29 RX ORDER — LEVOTHYROXINE SODIUM 0.05 MG/1
50 TABLET ORAL
Qty: 30 TABLET | Refills: 1 | Status: SHIPPED | OUTPATIENT
Start: 2021-10-29 | End: 2021-11-23

## 2021-11-04 ENCOUNTER — VIRTUAL PHONE E/M (OUTPATIENT)
Dept: FAMILY MEDICINE CLINIC | Facility: CLINIC | Age: 81
End: 2021-11-04
Payer: MEDICARE

## 2021-11-04 DIAGNOSIS — R19.5 LOOSE STOOLS: Primary | ICD-10-CM

## 2021-11-04 PROCEDURE — 99443 PHONE E/M BY PHYS 21-30 MIN: CPT | Performed by: INTERNAL MEDICINE

## 2021-11-05 ENCOUNTER — LAB ENCOUNTER (OUTPATIENT)
Dept: LAB | Age: 81
End: 2021-11-05
Attending: INTERNAL MEDICINE
Payer: MEDICARE

## 2021-11-05 DIAGNOSIS — R19.5 LOOSE STOOLS: ICD-10-CM

## 2021-11-05 PROCEDURE — 87046 STOOL CULTR AEROBIC BACT EA: CPT

## 2021-11-05 PROCEDURE — 87272 CRYPTOSPORIDIUM AG IF: CPT

## 2021-11-05 PROCEDURE — 87209 SMEAR COMPLEX STAIN: CPT

## 2021-11-05 PROCEDURE — 87045 FECES CULTURE AEROBIC BACT: CPT

## 2021-11-05 PROCEDURE — 87493 C DIFF AMPLIFIED PROBE: CPT

## 2021-11-05 PROCEDURE — 87177 OVA AND PARASITES SMEARS: CPT

## 2021-11-05 PROCEDURE — 87427 SHIGA-LIKE TOXIN AG IA: CPT

## 2021-11-05 PROCEDURE — 87329 GIARDIA AG IA: CPT

## 2021-11-05 NOTE — PROGRESS NOTES
Virtual Telephone Check-In    Lv Hayes verbally consents to a Virtual/Telephone Check-In visit on 11/04/21. Patient has been referred to the [facility] website at [email or web address] to review the yearly Consent to Treat document.     Patient underst capsule 12   • Azelastine HCl 0.1 % Nasal Solution 1 spray by Nasal route 2 (two) times daily.  30 mL 2      Past Medical History:   Diagnosis Date   • Dermatographic urticaria    • Esophageal reflux    • Lumbago    • Mitral valve prolapse determined by vioelt patient indicates understanding of these issues and agrees to the plan. Follow up 1 week. Dick Payan, ELOINA Roger understands phone evaluation is not a substitute for face-to-face examination or emergency care.  Patient advised

## 2021-11-08 RX ORDER — MONTELUKAST SODIUM 10 MG/1
TABLET ORAL
Qty: 90 TABLET | Refills: 1 | Status: SHIPPED | OUTPATIENT
Start: 2021-11-08

## 2021-11-23 ENCOUNTER — HOSPITAL ENCOUNTER (OUTPATIENT)
Dept: MAMMOGRAPHY | Age: 81
Discharge: HOME | End: 2021-11-23
Attending: FAMILY MEDICINE
Payer: MEDICARE

## 2021-11-23 DIAGNOSIS — Z12.31 ENCOUNTER FOR SCREENING MAMMOGRAM FOR MALIGNANT NEOPLASM OF BREAST: ICD-10-CM

## 2021-11-23 PROCEDURE — 77063 BREAST TOMOSYNTHESIS BI: CPT | Performed by: FAMILY MEDICINE

## 2021-11-23 PROCEDURE — 77067 SCR MAMMO BI INCL CAD: CPT | Performed by: FAMILY MEDICINE

## 2021-11-23 RX ORDER — LEVOTHYROXINE SODIUM 0.05 MG/1
TABLET ORAL
Qty: 30 TABLET | Refills: 1 | Status: SHIPPED | OUTPATIENT
Start: 2021-11-23 | End: 2021-12-20

## 2021-12-20 RX ORDER — LEVOTHYROXINE SODIUM 0.05 MG/1
TABLET ORAL
Qty: 30 TABLET | Refills: 1 | Status: SHIPPED | OUTPATIENT
Start: 2021-12-20 | End: 2022-01-11

## 2022-01-11 RX ORDER — LEVOTHYROXINE SODIUM 0.05 MG/1
TABLET ORAL
Qty: 30 TABLET | Refills: 1 | Status: SHIPPED | OUTPATIENT
Start: 2022-01-11 | End: 2022-01-12

## 2022-01-12 ENCOUNTER — TELEPHONE (OUTPATIENT)
Dept: FAMILY MEDICINE CLINIC | Facility: CLINIC | Age: 82
End: 2022-01-12

## 2022-01-12 RX ORDER — LEVOTHYROXINE SODIUM 0.05 MG/1
50 TABLET ORAL
Qty: 30 TABLET | Refills: 1 | Status: SHIPPED | OUTPATIENT
Start: 2022-01-12 | End: 2022-02-07

## 2022-01-12 NOTE — TELEPHONE ENCOUNTER
Pt's levothyroxine went to wrong pharmacy -  asking for it to be resent to [facility] [address], [place], [address] [place] [address]   #[phone].   Pharmacy in [facility] said they cannot transfer it

## 2022-01-19 ENCOUNTER — TELEPHONE (OUTPATIENT)
Dept: FAMILY MEDICINE CLINIC | Facility: CLINIC | Age: 82
End: 2022-01-19

## 2022-01-19 RX ORDER — SULFAMETHOXAZOLE AND TRIMETHOPRIM 800; 160 MG/1; MG/1
1 TABLET ORAL 2 TIMES DAILY
Qty: 10 TABLET | Refills: 0 | Status: SHIPPED | OUTPATIENT
Start: 2022-01-19 | End: 2022-01-24

## 2022-01-19 NOTE — TELEPHONE ENCOUNTER
Patient had UTI symptoms: Urgency, pain, pressure, frequency, started a week ago, has taken 7 days of Keflex, was doing ok- symptoms went away. Then took one this morning again and only has 2 pills left of prescription from 8/17/21.      Lines and lines at

## 2022-01-19 NOTE — TELEPHONE ENCOUNTER
Pt is in [facility]. [address] and she feels her UTI has Returned.    Dr. Marissa Lemus has given her cephalexin in the past   Pt would like to know if he will call in another RX for her   She would like to speak to a nurse     [facility] phone # [phone]

## 2022-01-19 NOTE — TELEPHONE ENCOUNTER
So I am a little confused. Did she take a full weeks worth of Keflex now or did she have some leftover pills that she took only 1 of starting this morning?   If she has had a weeks worth of Keflex now and feels like the symptoms persist, I would not recomm

## 2022-01-19 NOTE — TELEPHONE ENCOUNTER
Thank you for clarifying. She is allergic to penicillin.   Use Bactrim DS 1 by mouth twice a day for 5 days

## 2022-01-19 NOTE — TELEPHONE ENCOUNTER
She took a week, symptoms improved so she stopped, symptoms returned, restarted keflex this morning as her symptoms are returning-becoming intense.

## 2022-02-07 RX ORDER — LEVOTHYROXINE SODIUM 0.05 MG/1
TABLET ORAL
Qty: 30 TABLET | Refills: 1 | Status: SHIPPED | OUTPATIENT
Start: 2022-02-07 | End: 2022-03-04

## 2022-03-04 RX ORDER — LEVOTHYROXINE SODIUM 0.05 MG/1
TABLET ORAL
Qty: 30 TABLET | Refills: 1 | Status: SHIPPED | OUTPATIENT
Start: 2022-03-04 | End: 2022-03-04

## 2022-03-17 ENCOUNTER — TELEPHONE (OUTPATIENT)
Dept: FAMILY MEDICINE CLINIC | Facility: CLINIC | Age: 82
End: 2022-03-17

## 2022-03-17 ENCOUNTER — HOSPITAL ENCOUNTER (OUTPATIENT)
Age: 82
Discharge: HOME | End: 2022-03-17
Payer: MEDICARE

## 2022-03-17 VITALS
DIASTOLIC BLOOD PRESSURE: 78 MMHG | HEIGHT: 63 IN | RESPIRATION RATE: 20 BRPM | SYSTOLIC BLOOD PRESSURE: 126 MMHG | BODY MASS INDEX: 18.07 KG/M2 | WEIGHT: 102 LBS | TEMPERATURE: 99 F | OXYGEN SATURATION: 97 % | HEART RATE: 69 BPM

## 2022-03-17 DIAGNOSIS — U07.1 COVID: Primary | ICD-10-CM

## 2022-03-17 LAB — SARS-COV-2 RNA RESP QL NAA+PROBE: DETECTED

## 2022-03-17 PROCEDURE — 99214 OFFICE O/P EST MOD 30 MIN: CPT

## 2022-03-17 PROCEDURE — 99213 OFFICE O/P EST LOW 20 MIN: CPT

## 2022-03-17 NOTE — TELEPHONE ENCOUNTER
Spoke with patient, she states she 2 weeks ago returned from [place]. She explains, she had sniffles for a week, on Tuesday afternoon 3/15- cold symptoms started to worsen, chills, body aches, no fever, +cough, nasal drainage making her cough. Used inhaler yesterday, a little tightness in chest, has not needed inhaler yet today. Today temp 99.0, no upset stomach, never had sore throat- still no sore throat, + body aches, + headache. Patient sounds alert on phone, want to know about quarantine, reviewed CDC. Dr. Jacki Duran, should patient have the antibody infusion at [facility] ?

## 2022-03-17 NOTE — TELEPHONE ENCOUNTER
I have not been following the CDC guidelines recently. The most recent recommendations I know off are to quarantine for 5 days from the time the symptoms started so three 15 to 320. Then 5 days must wear a mask if leaving the house.

## 2022-03-17 NOTE — TELEPHONE ENCOUNTER
Patient tested positive with home test for Covid. She is looking for advice on quarantine etc.  She just returned from [place].

## 2022-03-17 NOTE — TELEPHONE ENCOUNTER
Discussed with Dr. Desirae Keen, he advises would be a good idea to advise patient to go to [facility] for evaluation of infusion due to comorbities and age. Patient notified, verbalized understanding, [facility]  address and phone number provided.

## 2022-03-30 RX ORDER — LEVOTHYROXINE SODIUM 0.05 MG/1
TABLET ORAL
Qty: 30 TABLET | Refills: 1 | Status: SHIPPED | OUTPATIENT
Start: 2022-03-30

## 2022-04-25 ENCOUNTER — TELEPHONE (OUTPATIENT)
Dept: FAMILY MEDICINE CLINIC | Facility: CLINIC | Age: 82
End: 2022-04-25

## 2022-04-25 NOTE — TELEPHONE ENCOUNTER
FYI:  Patient will complete Bactrim series on Thursday. She started antibiotic on Sunday morning 04/24/22 when she woke up with UTI symptoms. [facility] will schedule follow up next week with Dr Lidia Nolen if symptoms persist. Agreed to note and inform physician.

## 2022-04-25 NOTE — TELEPHONE ENCOUNTER
Pt was given bactrim by Dr. Fadia Saba at the beginning of April. Pt started taking it yesterday and will be finished on Thursday. Pt wants to know if Dr. Fadia Saba wants to see her after she is done ?     Please advise

## 2022-05-03 ENCOUNTER — LAB ENCOUNTER (OUTPATIENT)
Dept: LAB | Age: 82
End: 2022-05-03
Attending: FAMILY MEDICINE
Payer: MEDICARE

## 2022-05-03 DIAGNOSIS — R39.9 URINARY SYMPTOM OR SIGN: ICD-10-CM

## 2022-05-03 DIAGNOSIS — R30.0 DYSURIA: ICD-10-CM

## 2022-05-03 DIAGNOSIS — N39.0 RECURRENT UTI: ICD-10-CM

## 2022-05-03 LAB
BILIRUB UR QL STRIP.AUTO: NEGATIVE
CLARITY UR REFRACT.AUTO: CLEAR
GLUCOSE UR STRIP.AUTO-MCNC: NEGATIVE MG/DL
KETONES UR STRIP.AUTO-MCNC: NEGATIVE MG/DL
NITRITE UR QL STRIP.AUTO: NEGATIVE
PH UR STRIP.AUTO: 6 [PH] (ref 5–8)
PROT UR STRIP.AUTO-MCNC: NEGATIVE MG/DL
RBC UR QL AUTO: NEGATIVE
SP GR UR STRIP.AUTO: 1.01 (ref 1–1.03)
UROBILINOGEN UR STRIP.AUTO-MCNC: <2 MG/DL

## 2022-05-03 PROCEDURE — 81001 URINALYSIS AUTO W/SCOPE: CPT

## 2022-05-03 PROCEDURE — 87086 URINE CULTURE/COLONY COUNT: CPT

## 2022-05-03 NOTE — TELEPHONE ENCOUNTER
Ordered and pt informed. She asked for meds as prophylaxis. Advised we will wait and see what results are. Pt agreeable to plan.

## 2022-05-03 NOTE — TELEPHONE ENCOUNTER
Dr Bulmaro Hendrickson, pt wants to make nurse visit for UA/Cx. She was advised last week, per notes if no improvement, she would need to see you. Pt states she was told she could do nurse visit. Are you agreeable to nurse only visit for UA/Cx? Soonest provider OV are 5/6/22    Please advise.

## 2022-05-03 NOTE — TELEPHONE ENCOUNTER
Patient calling stating that she has finished the antibiotic and she is starting to feel that the symptoms are returning. States that the nurse told her if that was to happen to contact the office for a nurse visit to have a urine sample done. Please advise.

## 2022-05-04 RX ORDER — SULFAMETHOXAZOLE AND TRIMETHOPRIM 800; 160 MG/1; MG/1
1 TABLET ORAL 2 TIMES DAILY
Qty: 10 TABLET | Refills: 0 | Status: SHIPPED | OUTPATIENT
Start: 2022-05-04 | End: 2022-05-09

## 2022-05-05 ENCOUNTER — TELEPHONE (OUTPATIENT)
Dept: FAMILY MEDICINE CLINIC | Facility: CLINIC | Age: 82
End: 2022-05-05

## 2022-05-05 NOTE — TELEPHONE ENCOUNTER
I did not realize this was a follow-up urine culture after having started the antibiotics. No more antibiotics are necessary.

## 2022-05-05 NOTE — TELEPHONE ENCOUNTER
Pt wants to know if she should still be taking abx for her uti - if so, she does not have any left.   pls advise

## 2022-05-24 RX ORDER — MONTELUKAST SODIUM 10 MG/1
TABLET ORAL
Qty: 90 TABLET | Refills: 1 | Status: SHIPPED | OUTPATIENT
Start: 2022-05-24

## 2022-07-15 RX ORDER — LEVOTHYROXINE SODIUM 0.05 MG/1
TABLET ORAL
Qty: 90 TABLET | Refills: 0 | Status: SHIPPED | OUTPATIENT
Start: 2022-07-15

## 2022-09-28 ENCOUNTER — TELEPHONE (OUTPATIENT)
Dept: FAMILY MEDICINE CLINIC | Facility: CLINIC | Age: 82
End: 2022-09-28

## 2022-09-28 NOTE — TELEPHONE ENCOUNTER
Patient reports symptoms persist although less than before.   2 days Bactrim DS called to [facility] at her request.

## 2022-09-28 NOTE — TELEPHONE ENCOUNTER
Pt has been on medication for UTI for 5 days. She usually is on it for 7 since she gets these about every 5 months and 5 days does not seem to be enough.

## 2022-09-28 NOTE — TELEPHONE ENCOUNTER
Traditional UTI treatment is 5 days. And can be reduced to 2 days in some patients. If she is not responding after 5 days okay to add 2 more days.

## 2022-09-28 NOTE — TELEPHONE ENCOUNTER
On 05/04/22 Dr Bulmaro Hendrickson prescribed Bactrim DS x 5 day regimen for treatment of UTI. Patient sates she usually gets 7 day supply. Today, she is on day #5 of antibiotic and her urinary symptoms are better other than continued frequency. She is requesting 2 more days of antibiotic to be sent to [facility]. She has physical exam next week on 10/05/22 with Dr Bulmaro Hendrickson. Please advise.

## 2022-09-29 ENCOUNTER — TELEPHONE (OUTPATIENT)
Dept: FAMILY MEDICINE CLINIC | Facility: CLINIC | Age: 82
End: 2022-09-29

## 2022-09-29 DIAGNOSIS — N30.00 ACUTE CYSTITIS WITHOUT HEMATURIA: ICD-10-CM

## 2022-09-29 RX ORDER — SULFAMETHOXAZOLE AND TRIMETHOPRIM 800; 160 MG/1; MG/1
1 TABLET ORAL 2 TIMES DAILY
Qty: 4 TABLET | Refills: 0 | Status: SHIPPED | OUTPATIENT
Start: 2022-09-29 | End: 2022-10-01

## 2022-09-29 NOTE — TELEPHONE ENCOUNTER
Pt called yesterday regarding her uti and states someone was supposed to call her back after they spoke to Dr. Orlando Ortega     Please advise

## 2022-09-29 NOTE — TELEPHONE ENCOUNTER
Spoke with patient:  See tele enc from 9/28/22, sent meds electronically as they did not notify patient/order not received.

## 2022-10-05 ENCOUNTER — LAB ENCOUNTER (OUTPATIENT)
Dept: LAB | Age: 82
End: 2022-10-05
Attending: FAMILY MEDICINE
Payer: MEDICARE

## 2022-10-05 ENCOUNTER — OFFICE VISIT (OUTPATIENT)
Dept: FAMILY MEDICINE CLINIC | Facility: CLINIC | Age: 82
End: 2022-10-05
Payer: MEDICARE

## 2022-10-05 VITALS
SYSTOLIC BLOOD PRESSURE: 132 MMHG | HEIGHT: 63 IN | RESPIRATION RATE: 16 BRPM | OXYGEN SATURATION: 98 % | DIASTOLIC BLOOD PRESSURE: 66 MMHG | HEART RATE: 72 BPM | WEIGHT: 103 LBS | BODY MASS INDEX: 18.25 KG/M2

## 2022-10-05 DIAGNOSIS — Z13.6 SCREENING FOR CARDIOVASCULAR CONDITION: ICD-10-CM

## 2022-10-05 DIAGNOSIS — Z23 FLU VACCINE NEED: ICD-10-CM

## 2022-10-05 DIAGNOSIS — Z00.00 ENCOUNTER FOR ANNUAL HEALTH EXAMINATION: ICD-10-CM

## 2022-10-05 DIAGNOSIS — J45.20 MILD INTERMITTENT ASTHMA WITHOUT COMPLICATION: ICD-10-CM

## 2022-10-05 DIAGNOSIS — N39.0 RECURRENT UTI: ICD-10-CM

## 2022-10-05 DIAGNOSIS — Z00.00 ENCOUNTER FOR ANNUAL HEALTH EXAMINATION: Primary | ICD-10-CM

## 2022-10-05 LAB
ALBUMIN SERPL-MCNC: 3.9 G/DL (ref 3.4–5)
ALBUMIN/GLOB SERPL: 1 {RATIO} (ref 1–2)
ALP LIVER SERPL-CCNC: 116 U/L
ALT SERPL-CCNC: 26 U/L
ANION GAP SERPL CALC-SCNC: 6 MMOL/L (ref 0–18)
APPEARANCE: CLEAR
AST SERPL-CCNC: 20 U/L (ref 15–37)
BILIRUB SERPL-MCNC: 1 MG/DL (ref 0.1–2)
BILIRUBIN: NEGATIVE
BUN BLD-MCNC: 13 MG/DL (ref 7–18)
CALCIUM BLD-MCNC: 9.1 MG/DL (ref 8.5–10.1)
CHLORIDE SERPL-SCNC: 102 MMOL/L (ref 98–112)
CHOLEST SERPL-MCNC: 165 MG/DL (ref ?–200)
CO2 SERPL-SCNC: 25 MMOL/L (ref 21–32)
CREAT BLD-MCNC: 0.87 MG/DL
FASTING PATIENT LIPID ANSWER: YES
FASTING STATUS PATIENT QL REPORTED: YES
GFR SERPLBLD BASED ON 1.73 SQ M-ARVRAT: 68 ML/MIN/1.73M2 (ref 60–?)
GLOBULIN PLAS-MCNC: 3.9 G/DL (ref 2.8–4.4)
GLUCOSE (URINE DIPSTICK): NEGATIVE MG/DL
GLUCOSE BLD-MCNC: 88 MG/DL (ref 70–99)
HDLC SERPL-MCNC: 67 MG/DL (ref 40–59)
KETONES (URINE DIPSTICK): NEGATIVE MG/DL
LDLC SERPL CALC-MCNC: 87 MG/DL (ref ?–100)
LEUKOCYTES: NEGATIVE
MULTISTIX EXPIRATION DATE: NORMAL DATE
MULTISTIX LOT#: NORMAL NUMERIC
NITRITE, URINE: NEGATIVE
NONHDLC SERPL-MCNC: 98 MG/DL (ref ?–130)
OCCULT BLOOD: NEGATIVE
OSMOLALITY SERPL CALC.SUM OF ELEC: 276 MOSM/KG (ref 275–295)
PH, URINE: 6 (ref 4.5–8)
POTASSIUM SERPL-SCNC: 4.4 MMOL/L (ref 3.5–5.1)
PROT SERPL-MCNC: 7.8 G/DL (ref 6.4–8.2)
PROTEIN (URINE DIPSTICK): NEGATIVE MG/DL
SODIUM SERPL-SCNC: 133 MMOL/L (ref 136–145)
SPECIFIC GRAVITY: <=1.005 (ref 1–1.03)
TRIGL SERPL-MCNC: 52 MG/DL (ref 30–149)
TSI SER-ACNC: 1.18 MIU/ML (ref 0.36–3.74)
URINE-COLOR: YELLOW
UROBILINOGEN,SEMI-QN: 0.2 MG/DL (ref 0–1.9)
VLDLC SERPL CALC-MCNC: 8 MG/DL (ref 0–30)

## 2022-10-05 PROCEDURE — 90662 IIV NO PRSV INCREASED AG IM: CPT | Performed by: FAMILY MEDICINE

## 2022-10-05 PROCEDURE — G0008 ADMIN INFLUENZA VIRUS VAC: HCPCS | Performed by: FAMILY MEDICINE

## 2022-10-05 PROCEDURE — 84443 ASSAY THYROID STIM HORMONE: CPT

## 2022-10-05 PROCEDURE — 80061 LIPID PANEL: CPT

## 2022-10-05 PROCEDURE — G0439 PPPS, SUBSEQ VISIT: HCPCS | Performed by: FAMILY MEDICINE

## 2022-10-05 PROCEDURE — 81003 URINALYSIS AUTO W/O SCOPE: CPT | Performed by: FAMILY MEDICINE

## 2022-10-05 PROCEDURE — 80053 COMPREHEN METABOLIC PANEL: CPT

## 2022-10-05 PROCEDURE — 36415 COLL VENOUS BLD VENIPUNCTURE: CPT

## 2022-10-05 PROCEDURE — 1125F AMNT PAIN NOTED PAIN PRSNT: CPT | Performed by: FAMILY MEDICINE

## 2022-10-05 RX ORDER — ALBUTEROL SULFATE 90 UG/1
2 AEROSOL, METERED RESPIRATORY (INHALATION) EVERY 6 HOURS PRN
Qty: 18 G | Refills: 1 | Status: SHIPPED | OUTPATIENT
Start: 2022-10-05

## 2022-10-05 RX ORDER — SULFAMETHOXAZOLE AND TRIMETHOPRIM 800; 160 MG/1; MG/1
1 TABLET ORAL 2 TIMES DAILY
Qty: 14 TABLET | Refills: 1 | Status: SHIPPED | OUTPATIENT
Start: 2022-10-05 | End: 2022-10-10

## 2022-10-13 RX ORDER — LEVOTHYROXINE SODIUM 0.05 MG/1
TABLET ORAL
Qty: 90 TABLET | Refills: 0 | Status: SHIPPED | OUTPATIENT
Start: 2022-10-13

## 2022-11-16 RX ORDER — MONTELUKAST SODIUM 10 MG/1
TABLET ORAL
Qty: 90 TABLET | Refills: 1 | Status: SHIPPED | OUTPATIENT
Start: 2022-11-16

## 2023-01-09 RX ORDER — LEVOTHYROXINE SODIUM 0.05 MG/1
TABLET ORAL
Qty: 90 TABLET | Refills: 0 | Status: SHIPPED | OUTPATIENT
Start: 2023-01-09

## 2023-03-06 ENCOUNTER — PATIENT MESSAGE (OUTPATIENT)
Dept: FAMILY MEDICINE CLINIC | Facility: CLINIC | Age: 83
End: 2023-03-06

## 2023-03-07 NOTE — TELEPHONE ENCOUNTER
From: Stiven Monge  To: Kyle Daily MD  Sent: 3/6/2023 2:31 PM CST  Subject: Covid shot    Website said I had an overdue reminder for Covid vaccine shot. I had one on 7/20/22 and another one on 10/25/22. Both were at [facility]. Please update records. Because of medication issues with Bone Density screening,  said not to take anymore.  If you need anything else to update, let me know

## 2023-03-20 ENCOUNTER — HOSPITAL ENCOUNTER (OUTPATIENT)
Dept: GENERAL RADIOLOGY | Age: 83
Discharge: HOME | End: 2023-03-20
Attending: FAMILY MEDICINE
Payer: MEDICARE

## 2023-03-20 ENCOUNTER — OFFICE VISIT (OUTPATIENT)
Dept: FAMILY MEDICINE CLINIC | Facility: CLINIC | Age: 83
End: 2023-03-20
Payer: MEDICARE

## 2023-03-20 VITALS
RESPIRATION RATE: 16 BRPM | DIASTOLIC BLOOD PRESSURE: 60 MMHG | WEIGHT: 107.63 LBS | HEIGHT: 63 IN | BODY MASS INDEX: 19.07 KG/M2 | HEART RATE: 71 BPM | SYSTOLIC BLOOD PRESSURE: 122 MMHG | OXYGEN SATURATION: 96 %

## 2023-03-20 DIAGNOSIS — G89.29 CHRONIC SI JOINT PAIN: ICD-10-CM

## 2023-03-20 DIAGNOSIS — M79.672 CHRONIC FOOT PAIN, LEFT: ICD-10-CM

## 2023-03-20 DIAGNOSIS — M53.3 CHRONIC SI JOINT PAIN: Primary | ICD-10-CM

## 2023-03-20 DIAGNOSIS — G89.29 CHRONIC FOOT PAIN, LEFT: ICD-10-CM

## 2023-03-20 DIAGNOSIS — G89.29 CHRONIC SI JOINT PAIN: Primary | ICD-10-CM

## 2023-03-20 DIAGNOSIS — M53.3 CHRONIC SI JOINT PAIN: ICD-10-CM

## 2023-03-20 PROCEDURE — 73630 X-RAY EXAM OF FOOT: CPT | Performed by: FAMILY MEDICINE

## 2023-03-20 PROCEDURE — 72202 X-RAY EXAM SI JOINTS 3/> VWS: CPT | Performed by: FAMILY MEDICINE

## 2023-03-21 PROBLEM — M16.0 PRIMARY OSTEOARTHRITIS OF BOTH HIPS: Status: ACTIVE | Noted: 2023-03-21

## 2023-03-30 DIAGNOSIS — J45.20 MILD INTERMITTENT ASTHMA WITHOUT COMPLICATION: ICD-10-CM

## 2023-03-30 RX ORDER — ALBUTEROL SULFATE 90 UG/1
AEROSOL, METERED RESPIRATORY (INHALATION)
Qty: 18 EACH | Refills: 1 | Status: SHIPPED | OUTPATIENT
Start: 2023-03-30

## 2023-04-06 RX ORDER — LEVOTHYROXINE SODIUM 0.05 MG/1
TABLET ORAL
Qty: 90 TABLET | Refills: 0 | Status: SHIPPED | OUTPATIENT
Start: 2023-04-06

## 2023-05-16 ENCOUNTER — PATIENT MESSAGE (OUTPATIENT)
Dept: FAMILY MEDICINE CLINIC | Facility: CLINIC | Age: 83
End: 2023-05-16

## 2023-05-16 RX ORDER — SULFAMETHOXAZOLE AND TRIMETHOPRIM 800; 160 MG/1; MG/1
1 TABLET ORAL 2 TIMES DAILY
COMMUNITY
End: 2023-05-16

## 2023-05-16 RX ORDER — SULFAMETHOXAZOLE AND TRIMETHOPRIM 800; 160 MG/1; MG/1
TABLET ORAL
Qty: 10 TABLET | Refills: 0 | Status: SHIPPED | OUTPATIENT
Start: 2023-05-16

## 2023-05-16 NOTE — TELEPHONE ENCOUNTER
From: [facility]  To: Darian Romo MD  Sent: 5/16/2023 12:49 PM CDT  Subject: Bladder infection    I got a bladder infection the other day. It was the first one I have had since last September. I started Sulfamethoxazole-tmp ds pills last night from the prescription you gave me last year. Should I come in for a urine sample culture after 7 days of the medication? I will also need a new prescription to keep on hand as I had before. Thanks.

## 2023-05-16 NOTE — TELEPHONE ENCOUNTER
Patient believes she has UTI. She had leftover antibiotics from last year she is taking. A. Should she come one and have a urinalysis done   B.  Asking for a refill on Sulfamethoxazole

## 2023-07-06 RX ORDER — LEVOTHYROXINE SODIUM 0.05 MG/1
TABLET ORAL
Qty: 90 TABLET | Refills: 0 | Status: SHIPPED | OUTPATIENT
Start: 2023-07-06

## 2023-07-21 RX ORDER — MONTELUKAST SODIUM 10 MG/1
10 TABLET ORAL NIGHTLY
Qty: 90 TABLET | Refills: 1 | Status: SHIPPED | OUTPATIENT
Start: 2023-07-21

## 2023-07-21 NOTE — TELEPHONE ENCOUNTER
A refill request was received for:  Requested Prescriptions     Pending Prescriptions Disp Refills    MONTELUKAST 10 MG Oral Tab [Pharmacy Med Name: MONTELUKAST SOD 10 MG TABLET] 90 tablet 1     Sig: TAKE 1 TABLET BY MOUTH EVERYDAY AT BEDTIME       Last refill date:11/16/2022       Last office visit:10/5/2022     Follow up due:  Future Appointments   Date Time Provider Khloe Bryant   10/2/2023  8:30 AM Verlean Snellen, MD [facility] [address] [facility] [address]

## 2023-10-02 ENCOUNTER — LAB ENCOUNTER (OUTPATIENT)
Dept: LAB | Age: 83
End: 2023-10-02
Attending: FAMILY MEDICINE
Payer: MEDICARE

## 2023-10-02 ENCOUNTER — OFFICE VISIT (OUTPATIENT)
Dept: FAMILY MEDICINE CLINIC | Facility: CLINIC | Age: 83
End: 2023-10-02
Payer: MEDICARE

## 2023-10-02 VITALS
HEART RATE: 87 BPM | DIASTOLIC BLOOD PRESSURE: 70 MMHG | BODY MASS INDEX: 19.31 KG/M2 | OXYGEN SATURATION: 95 % | RESPIRATION RATE: 16 BRPM | WEIGHT: 109 LBS | SYSTOLIC BLOOD PRESSURE: 136 MMHG | HEIGHT: 63 IN

## 2023-10-02 DIAGNOSIS — J30.89 SEASONAL ALLERGIC RHINITIS DUE TO OTHER ALLERGIC TRIGGER: ICD-10-CM

## 2023-10-02 DIAGNOSIS — J45.20 MILD INTERMITTENT ASTHMA WITHOUT COMPLICATION: ICD-10-CM

## 2023-10-02 DIAGNOSIS — Z00.00 ENCOUNTER FOR ANNUAL HEALTH EXAMINATION: Primary | ICD-10-CM

## 2023-10-02 DIAGNOSIS — H61.21 IMPACTED CERUMEN OF RIGHT EAR: ICD-10-CM

## 2023-10-02 DIAGNOSIS — E03.8 OTHER SPECIFIED HYPOTHYROIDISM: ICD-10-CM

## 2023-10-02 DIAGNOSIS — M17.11 PRIMARY OSTEOARTHRITIS OF RIGHT KNEE: ICD-10-CM

## 2023-10-02 DIAGNOSIS — Z13.6 SCREENING FOR CARDIOVASCULAR CONDITION: ICD-10-CM

## 2023-10-02 DIAGNOSIS — Z23 FLU VACCINE NEED: ICD-10-CM

## 2023-10-02 DIAGNOSIS — M19.049 LOCALIZED, PRIMARY OSTEOARTHRITIS OF HAND, UNSPECIFIED LATERALITY: ICD-10-CM

## 2023-10-02 DIAGNOSIS — Z00.00 ENCOUNTER FOR ANNUAL HEALTH EXAMINATION: ICD-10-CM

## 2023-10-02 DIAGNOSIS — M16.0 PRIMARY OSTEOARTHRITIS OF BOTH HIPS: ICD-10-CM

## 2023-10-02 LAB
ALBUMIN SERPL-MCNC: 4 G/DL (ref 3.4–5)
ALBUMIN/GLOB SERPL: 1.1 {RATIO} (ref 1–2)
ALP LIVER SERPL-CCNC: 109 U/L
ALT SERPL-CCNC: 25 U/L
ANION GAP SERPL CALC-SCNC: 6 MMOL/L (ref 0–18)
AST SERPL-CCNC: 25 U/L (ref 15–37)
BASOPHILS # BLD AUTO: 0.06 X10(3) UL (ref 0–0.2)
BASOPHILS NFR BLD AUTO: 1.1 %
BILIRUB SERPL-MCNC: 1.5 MG/DL (ref 0.1–2)
BUN BLD-MCNC: 12 MG/DL (ref 7–18)
CALCIUM BLD-MCNC: 9.5 MG/DL (ref 8.5–10.1)
CHLORIDE SERPL-SCNC: 106 MMOL/L (ref 98–112)
CHOLEST SERPL-MCNC: 188 MG/DL (ref ?–200)
CO2 SERPL-SCNC: 26 MMOL/L (ref 21–32)
CREAT BLD-MCNC: 0.84 MG/DL
EGFRCR SERPLBLD CKD-EPI 2021: 70 ML/MIN/1.73M2 (ref 60–?)
EOSINOPHIL # BLD AUTO: 0.09 X10(3) UL (ref 0–0.7)
EOSINOPHIL NFR BLD AUTO: 1.7 %
ERYTHROCYTE [DISTWIDTH] IN BLOOD BY AUTOMATED COUNT: 12.7 %
FASTING PATIENT LIPID ANSWER: YES
FASTING STATUS PATIENT QL REPORTED: YES
GLOBULIN PLAS-MCNC: 3.6 G/DL (ref 2.8–4.4)
GLUCOSE BLD-MCNC: 100 MG/DL (ref 70–99)
HCT VFR BLD AUTO: 44.4 %
HDLC SERPL-MCNC: 61 MG/DL (ref 40–59)
HGB BLD-MCNC: 14.4 G/DL
IMM GRANULOCYTES # BLD AUTO: 0.01 X10(3) UL (ref 0–1)
IMM GRANULOCYTES NFR BLD: 0.2 %
LDLC SERPL CALC-MCNC: 114 MG/DL (ref ?–100)
LYMPHOCYTES # BLD AUTO: 1.21 X10(3) UL (ref 1–4)
LYMPHOCYTES NFR BLD AUTO: 22.9 %
MCH RBC QN AUTO: 31.9 PG (ref 26–34)
MCHC RBC AUTO-ENTMCNC: 32.4 G/DL (ref 31–37)
MCV RBC AUTO: 98.2 FL
MONOCYTES # BLD AUTO: 0.52 X10(3) UL (ref 0.1–1)
MONOCYTES NFR BLD AUTO: 9.8 %
NEUTROPHILS # BLD AUTO: 3.39 X10 (3) UL (ref 1.5–7.7)
NEUTROPHILS # BLD AUTO: 3.39 X10(3) UL (ref 1.5–7.7)
NEUTROPHILS NFR BLD AUTO: 64.3 %
NONHDLC SERPL-MCNC: 127 MG/DL (ref ?–130)
OSMOLALITY SERPL CALC.SUM OF ELEC: 286 MOSM/KG (ref 275–295)
PLATELET # BLD AUTO: 227 10(3)UL (ref 150–450)
POTASSIUM SERPL-SCNC: 4.8 MMOL/L (ref 3.5–5.1)
PROT SERPL-MCNC: 7.6 G/DL (ref 6.4–8.2)
RBC # BLD AUTO: 4.52 X10(6)UL
SODIUM SERPL-SCNC: 138 MMOL/L (ref 136–145)
TRIGL SERPL-MCNC: 70 MG/DL (ref 30–149)
TSI SER-ACNC: 1.94 MIU/ML (ref 0.36–3.74)
VLDLC SERPL CALC-MCNC: 12 MG/DL (ref 0–30)
WBC # BLD AUTO: 5.3 X10(3) UL (ref 4–11)

## 2023-10-02 PROCEDURE — 85025 COMPLETE CBC W/AUTO DIFF WBC: CPT

## 2023-10-02 PROCEDURE — G0439 PPPS, SUBSEQ VISIT: HCPCS | Performed by: FAMILY MEDICINE

## 2023-10-02 PROCEDURE — 80053 COMPREHEN METABOLIC PANEL: CPT

## 2023-10-02 PROCEDURE — G0008 ADMIN INFLUENZA VIRUS VAC: HCPCS | Performed by: FAMILY MEDICINE

## 2023-10-02 PROCEDURE — 36415 COLL VENOUS BLD VENIPUNCTURE: CPT

## 2023-10-02 PROCEDURE — 1125F AMNT PAIN NOTED PAIN PRSNT: CPT | Performed by: FAMILY MEDICINE

## 2023-10-02 PROCEDURE — 84443 ASSAY THYROID STIM HORMONE: CPT

## 2023-10-02 PROCEDURE — 80061 LIPID PANEL: CPT

## 2023-10-02 PROCEDURE — 90662 IIV NO PRSV INCREASED AG IM: CPT | Performed by: FAMILY MEDICINE

## 2023-10-02 RX ORDER — GARLIC EXTRACT 500 MG
1 CAPSULE ORAL DAILY
COMMUNITY

## 2023-10-03 RX ORDER — LEVOTHYROXINE SODIUM 0.05 MG/1
50 TABLET ORAL
Qty: 90 TABLET | Refills: 0 | Status: SHIPPED | OUTPATIENT
Start: 2023-10-03

## 2023-12-06 ENCOUNTER — PATIENT MESSAGE (OUTPATIENT)
Dept: FAMILY MEDICINE CLINIC | Facility: CLINIC | Age: 83
End: 2023-12-06

## 2023-12-06 DIAGNOSIS — N39.0 RECURRENT UTI: Primary | ICD-10-CM

## 2023-12-06 RX ORDER — SULFAMETHOXAZOLE AND TRIMETHOPRIM 800; 160 MG/1; MG/1
1 TABLET ORAL 2 TIMES DAILY
Qty: 10 TABLET | Refills: 0 | Status: SHIPPED | OUTPATIENT
Start: 2023-12-06 | End: 2023-12-11

## 2023-12-06 NOTE — TELEPHONE ENCOUNTER
From: May Santiago  To: Dakota Osborne  Sent: 12/6/2023 11:12 AM CST  Subject: Prescription refill    I used the Sulfamethoxazol-tmp Ds tablet prescription that I had and would like to have it refilled to have on hand if needed. Thanks.

## 2023-12-06 NOTE — TELEPHONE ENCOUNTER
Please see patient message-   States you ok'd to have Sulfamethoxazol-tmp Ds ON hand.  I am not seeing anything in your notes from last AWV   Please advise

## 2023-12-21 ENCOUNTER — TELEPHONE (OUTPATIENT)
Dept: FAMILY MEDICINE CLINIC | Facility: CLINIC | Age: 83
End: 2023-12-21

## 2023-12-21 DIAGNOSIS — N39.0 RECURRENT UTI: ICD-10-CM

## 2023-12-21 RX ORDER — SULFAMETHOXAZOLE AND TRIMETHOPRIM 800; 160 MG/1; MG/1
1 TABLET ORAL 2 TIMES DAILY
Qty: 10 TABLET | Refills: 0 | Status: SHIPPED | OUTPATIENT
Start: 2023-12-21 | End: 2023-12-26

## 2023-12-21 RX ORDER — LEVOTHYROXINE SODIUM 0.05 MG/1
50 TABLET ORAL
Qty: 90 TABLET | Refills: 0 | Status: SHIPPED | OUTPATIENT
Start: 2023-12-21

## 2023-12-21 NOTE — TELEPHONE ENCOUNTER
Spoke with patient:   2 weeks ago had bladder infection, for 10 years. Would like to have [facility] on hand. Discussed with [facility] 10/2/23. She doesn't want to be without in case during the holiday.  had covid on Monday on Paxlovid. Discussed CDC recs with her regarding isolation/mask requirements.

## 2023-12-21 NOTE — TELEPHONE ENCOUNTER
Pt calling and wants to speak to a nurse regarding a medication for a UTI    She also has some questions about covid     Please advise

## 2023-12-26 ENCOUNTER — TELEPHONE (OUTPATIENT)
Dept: FAMILY MEDICINE CLINIC | Facility: CLINIC | Age: 83
End: 2023-12-26

## 2023-12-26 ENCOUNTER — MOBILE ENCOUNTER (OUTPATIENT)
Dept: FAMILY MEDICINE CLINIC | Facility: CLINIC | Age: 83
End: 2023-12-26

## 2023-12-26 ENCOUNTER — PATIENT MESSAGE (OUTPATIENT)
Dept: FAMILY MEDICINE CLINIC | Facility: CLINIC | Age: 83
End: 2023-12-26

## 2023-12-26 DIAGNOSIS — U07.1 COVID: Primary | ICD-10-CM

## 2023-12-26 DIAGNOSIS — U07.1 COVID: ICD-10-CM

## 2023-12-26 NOTE — PROGRESS NOTES
son-in-law and daughter have all come down with COVID in the last 5 days. Yesterday she was feeling fatigued she developed rhinorrhea sore throat and this morning myalgia. 2 test yesterday were positive for COVID-19. She is not on a statin. Paxlovid sent to her local pharmacy. Discussed she may feel nauseated. She does not do well with medicine.   If she has trouble swallowing them she may call the office and the staff can  change this to molnupiravir 1 tablet twice a day

## 2023-12-26 NOTE — TELEPHONE ENCOUNTER
From: Elaine Winkler  To: Thierry Romero  Sent: 12/26/2023 11:13 AM CST  Subject: Paxlovid prescription    Has the prescription sent to [facility] in error been sent now to [facility] for Paxlovid? Would like to get it asap.

## 2023-12-26 NOTE — TELEPHONE ENCOUNTER
Patient  called stating that medication   nirmatrelvir-ritonavir 300-100 MG Oral Tablet Therapy Pack 30 tablet   Was sent to the wrong pharmacy. [address] [facility] [address] [phone], [phone]     Patient needs medication   nirmatrelvir-ritonavir 300-100 MG Oral Tablet Therapy Pack 30 tablet   to be sent to    [facility] #[id number]- [address] - [address]  [phone], [phone]       Please advise

## 2023-12-29 ENCOUNTER — TELEPHONE (OUTPATIENT)
Dept: FAMILY MEDICINE CLINIC | Facility: CLINIC | Age: 83
End: 2023-12-29

## 2023-12-29 NOTE — TELEPHONE ENCOUNTER
Covid symptoms improved and have basically resolved. Yesterday urinary symptoms started, + frequency + burning. Paxlovid was so strong, she states it caused UTI. She has ABT - Bactrim at home. RC, pt wants to know if she can stop the Paxlovid since Covid symptoms have resolved and start her Bactrim, she needs to know ASAP. Do you want patient to follow up with you for frequent UTIs?  Or specialist?

## 2023-12-29 NOTE — TELEPHONE ENCOUNTER
Pt on paxlovid and feels the med causing vaginal dryness and burning. She's worried it may cause a uti.   Pls advise

## 2024-01-22 RX ORDER — MONTELUKAST SODIUM 10 MG/1
10 TABLET ORAL NIGHTLY
Qty: 90 TABLET | Refills: 1 | Status: SHIPPED | OUTPATIENT
Start: 2024-01-22

## 2024-03-20 ENCOUNTER — TELEPHONE (OUTPATIENT)
Dept: FAMILY MEDICINE CLINIC | Facility: CLINIC | Age: 84
End: 2024-03-20

## 2024-03-20 ENCOUNTER — OFFICE VISIT (OUTPATIENT)
Dept: FAMILY MEDICINE CLINIC | Facility: CLINIC | Age: 84
End: 2024-03-20
Payer: MEDICARE

## 2024-03-20 VITALS
SYSTOLIC BLOOD PRESSURE: 124 MMHG | DIASTOLIC BLOOD PRESSURE: 82 MMHG | RESPIRATION RATE: 16 BRPM | HEART RATE: 78 BPM | HEIGHT: 63 IN | WEIGHT: 103 LBS | OXYGEN SATURATION: 98 % | BODY MASS INDEX: 18.25 KG/M2

## 2024-03-20 DIAGNOSIS — N89.8 VAGINAL DISCHARGE: Primary | ICD-10-CM

## 2024-03-20 DIAGNOSIS — R39.9 URINARY SYMPTOM OR SIGN: ICD-10-CM

## 2024-03-20 LAB
APPEARANCE: CLEAR
BILIRUBIN: NEGATIVE
GLUCOSE (URINE DIPSTICK): NEGATIVE MG/DL
KETONES (URINE DIPSTICK): NEGATIVE MG/DL
MULTISTIX LOT#: ABNORMAL NUMERIC
NITRITE, URINE: NEGATIVE
PH, URINE: 5.5 (ref 4.5–8)
PROTEIN (URINE DIPSTICK): NEGATIVE MG/DL
SPECIFIC GRAVITY: 1.01 (ref 1–1.03)
URINE-COLOR: YELLOW
UROBILINOGEN,SEMI-QN: 0.2 MG/DL (ref 0–1.9)

## 2024-03-20 PROCEDURE — 99213 OFFICE O/P EST LOW 20 MIN: CPT | Performed by: FAMILY MEDICINE

## 2024-03-20 PROCEDURE — 87086 URINE CULTURE/COLONY COUNT: CPT | Performed by: FAMILY MEDICINE

## 2024-03-20 PROCEDURE — 81003 URINALYSIS AUTO W/O SCOPE: CPT | Performed by: FAMILY MEDICINE

## 2024-03-20 RX ORDER — METRONIDAZOLE 7.5 MG/G
1 GEL VAGINAL NIGHTLY
Qty: 70 G | Refills: 0 | Status: SHIPPED | OUTPATIENT
Start: 2024-03-20 | End: 2024-03-25

## 2024-03-20 NOTE — PROGRESS NOTES
[facility] Progress Note    SUBJECTIVE: Monique Olsen 83 year old female is here today for   Chief Complaint   Patient presents with    Urinary     Blood in urine, she had some discharge in her underwear this morning       Had discharge this morning.    Gets UTIs one or two a year.    Yesterday noticed what she thought was a flow of urine which was abnormal, but moved on, this morning when she went to the bathroom saw a discharge on her underwear, with some discoloration. Nothing noticed since going to the bathroom.    No dysuria, normally will get very irritated, but not experiencing.    Does have a chill today, but she is worked up.    No vaginal itching.  No recent sexual activity.        PMH  Past Medical History:   Diagnosis Date    Dermatographic urticaria     Esophageal reflux     Lumbago     Mitral valve prolapse determined by imaging 10/2016    Personal history of arthritis     Personal history of pneumonia (recurrent)     Sprain of lumbar region         PSH  Past Surgical History:   Procedure Laterality Date    CHOLECYSTECTOMY      COLONOSCOPY      TONSILLECTOMY          Social Hx:  No changes    ROS  See HPI    OBJECTIVE:  /82   Pulse 78   Resp 16   Ht 5' 3\" (1.6 m)   Wt 103 lb (46.7 kg)   SpO2 98%   BMI 18.25 kg/m²     Exam        Labs:          Meds:   Current Outpatient Medications   Medication Sig Dispense Refill    metroNIDAZOLE 0.75 % Vaginal Gel Place 1 Application vaginally nightly for 5 days. 70 g 0    montelukast 10 MG Oral Tab Take 1 tablet (10 mg total) by mouth nightly. 90 tablet 1    LEVOTHYROXINE 50 MCG Oral Tab TAKE 1 TABLET BY MOUTH BEFORE BREAKFAST. 90 tablet 0    Fluticasone Propionate (KLS ALLER-FLORENTINO NA) by Nasal route.      acidophilus-pectin Oral Cap Take 1 capsule by mouth daily.      Misc Natural Products (OSTEO BI-FLEX ADV DOUBLE ST OR) Take by mouth.      ALBUTEROL 108 (90 Base) MCG/ACT Inhalation Aero Soln INHALE 2 PUFFS INTO THE LUNGS EVERY 6 HOURS AS  NEEDED. FOR WHEEZING 18 each 1    omega-3 fatty acids 1000 MG Oral Cap Take 2,000 mg by mouth daily. 60 capsule 12         Assessment/Plan  Monqiue was seen today for urinary.    Diagnoses and all orders for this visit:    Vaginal discharge  -     metroNIDAZOLE 0.75 % Vaginal Gel; Place 1 Application vaginally nightly for 5 days.  -     Cancel: Urinalysis, Routine [E]; Future  -     Urine Culture, Routine [E]; Future  -     Urine Dip, auto without Micro    Urinary symptom or sign  -     Cancel: Urinalysis, Routine [E]; Future  -     Urine Culture, Routine [E]; Future  -     Urine Dip, auto without Micro         Will check urine tests, but watch and wait for now, discharge appears to be mucous, no blood.    If recurs will treat empirically for BV, and see if resolves.         Total Time spent with patient and coordinating care:  20 minutes.    Follow up: as needed      Uriel Marcus MD

## 2024-04-02 RX ORDER — LEVOTHYROXINE SODIUM 0.05 MG/1
50 TABLET ORAL
Qty: 90 TABLET | Refills: 0 | Status: SHIPPED | OUTPATIENT
Start: 2024-04-02

## 2024-06-21 ENCOUNTER — OFFICE VISIT (OUTPATIENT)
Dept: FAMILY MEDICINE CLINIC | Facility: CLINIC | Age: 84
End: 2024-06-21

## 2024-06-21 VITALS
HEART RATE: 72 BPM | SYSTOLIC BLOOD PRESSURE: 124 MMHG | HEIGHT: 63 IN | BODY MASS INDEX: 17.89 KG/M2 | RESPIRATION RATE: 16 BRPM | WEIGHT: 101 LBS | DIASTOLIC BLOOD PRESSURE: 80 MMHG | OXYGEN SATURATION: 98 %

## 2024-06-21 DIAGNOSIS — L82.1 SEBORRHEIC KERATOSIS: Primary | ICD-10-CM

## 2024-06-21 PROCEDURE — 99212 OFFICE O/P EST SF 10 MIN: CPT | Performed by: FAMILY MEDICINE

## 2024-06-21 NOTE — PROGRESS NOTES
Full body skin check/  no F/H melanoma.  No itching or bleeding moles    Exam:  many SK and capillary hemangiomas.  One strawberry hemangioma posterior right arm.   No suspicious lesions.      A/P  seborrheic keratosis, cryo x 2 to lesion under left breast due to catching in bra.  Observe all others.

## 2024-06-21 NOTE — PATIENT INSTRUCTIONS
Prevnar 20 is the newest vaccine to prevent pneumonia.  It was released for the general population in 2022.  CDC guidelines state a patient over the age of 65 may elect to have this vaccine done 5 years after their most recent pneumonia vaccine.  Previous pneumonia vaccines include Pneumovax 23 and Prevnar 13.  If you received these 2 vaccines after your 65th birthday, you are considered fully vaccinated.       No

## 2024-07-01 RX ORDER — LEVOTHYROXINE SODIUM 0.05 MG/1
50 TABLET ORAL
Qty: 90 TABLET | Refills: 0 | Status: SHIPPED | OUTPATIENT
Start: 2024-07-01

## 2024-07-16 ENCOUNTER — TELEPHONE (OUTPATIENT)
Dept: FAMILY MEDICINE CLINIC | Facility: CLINIC | Age: 84
End: 2024-07-16

## 2024-07-18 RX ORDER — MONTELUKAST SODIUM 10 MG/1
10 TABLET ORAL NIGHTLY
Qty: 90 TABLET | Refills: 1 | Status: SHIPPED | OUTPATIENT
Start: 2024-07-18

## 2024-07-18 NOTE — TELEPHONE ENCOUNTER
A refill request was received for:  Requested Prescriptions     Pending Prescriptions Disp Refills    MONTELUKAST 10 MG Oral Tab [Pharmacy Med Name: MONTELUKAST SOD 10 MG TABLET] 90 tablet 1     Sig: TAKE 1 TABLET BY MOUTH EVERY DAY AT NIGHT       Last refill date:       Last office visit:     Follow up due:  Future Appointments   Date Time Provider Department Center   10/7/2024  8:00 AM Uriel Marcus MD [facility] [address]

## 2024-07-22 ENCOUNTER — TELEPHONE (OUTPATIENT)
Dept: ORTHOPEDICS CLINIC | Facility: CLINIC | Age: 84
End: 2024-07-22

## 2024-07-22 DIAGNOSIS — M54.50 LOW BACK PAIN, UNSPECIFIED BACK PAIN LATERALITY, UNSPECIFIED CHRONICITY, UNSPECIFIED WHETHER SCIATICA PRESENT: Primary | ICD-10-CM

## 2024-07-22 DIAGNOSIS — M53.3 COCCYX PAIN: ICD-10-CM

## 2024-07-22 NOTE — TELEPHONE ENCOUNTER
Patient is scheduled for tailbone pain. Please advise if imaging is needed.  Future Appointments   Date Time Provider Department Center   8/13/2024  2:00 PM Barron Moore,  [facility]   10/7/2024  8:00 AM Uriel Marcus MD [facility] [address]

## 2024-08-13 ENCOUNTER — OFFICE VISIT (OUTPATIENT)
Dept: ORTHOPEDICS CLINIC | Facility: CLINIC | Age: 84
End: 2024-08-13
Payer: MEDICARE

## 2024-08-13 ENCOUNTER — HOSPITAL ENCOUNTER (OUTPATIENT)
Dept: GENERAL RADIOLOGY | Age: 84
Discharge: HOME | End: 2024-08-13
Attending: FAMILY MEDICINE
Payer: MEDICARE

## 2024-08-13 VITALS — WEIGHT: 101 LBS | HEIGHT: 63 IN | BODY MASS INDEX: 17.89 KG/M2

## 2024-08-13 DIAGNOSIS — M79.10 MYALGIA: ICD-10-CM

## 2024-08-13 DIAGNOSIS — M54.50 LOW BACK PAIN, UNSPECIFIED BACK PAIN LATERALITY, UNSPECIFIED CHRONICITY, UNSPECIFIED WHETHER SCIATICA PRESENT: ICD-10-CM

## 2024-08-13 DIAGNOSIS — M53.3 COCCYALGIA: Primary | ICD-10-CM

## 2024-08-13 DIAGNOSIS — M53.3 COCCYX PAIN: ICD-10-CM

## 2024-08-13 PROCEDURE — 76942 ECHO GUIDE FOR BIOPSY: CPT | Performed by: FAMILY MEDICINE

## 2024-08-13 PROCEDURE — 72220 X-RAY EXAM SACRUM TAILBONE: CPT | Performed by: FAMILY MEDICINE

## 2024-08-13 PROCEDURE — 20552 NJX 1/MLT TRIGGER POINT 1/2: CPT | Performed by: FAMILY MEDICINE

## 2024-08-13 PROCEDURE — 99204 OFFICE O/P NEW MOD 45 MIN: CPT | Performed by: FAMILY MEDICINE

## 2024-08-13 RX ORDER — BETAMETHASONE SODIUM PHOSPHATE AND BETAMETHASONE ACETATE 3; 3 MG/ML; MG/ML
6 INJECTION, SUSPENSION INTRA-ARTICULAR; INTRALESIONAL; INTRAMUSCULAR; SOFT TISSUE ONCE
Status: COMPLETED | OUTPATIENT
Start: 2024-08-13 | End: 2024-08-13

## 2024-08-13 RX ADMIN — BETAMETHASONE SODIUM PHOSPHATE AND BETAMETHASONE ACETATE 6 MG: 3; 3 INJECTION, SUSPENSION INTRA-ARTICULAR; INTRALESIONAL; INTRAMUSCULAR; SOFT TISSUE at 12:43:00

## 2024-08-13 NOTE — H&P
Sports Medicine Clinic Note     Subjective:    Chief Complaint: Tailbone pain.    History of Present Illness: The patient is an 83-year-old female presenting with chronic tailbone pain that has been ongoing of and on for several years. The pain is described as burning and throbbing, with a severity of 7/10. The pain is intermittent and worsens with prolonged sitting, but improves with the use of heat and topical analgesics such as Arnica gel. She reports partial relief from prior physical therapy. The pain does not wake her at night, but she has difficulty falling asleep due to the discomfort. The patient prefers to use natural remedies and has been managing the pain primarily with a donut pillow and activity modifications under the guidance of her primary care doctor. She reports a low body mass index and bony prominence in the sacral/coccygeal area, which she believes may be contributing to her symptoms. Despite being very active in yoga and performing regular pelvic floor and stretching exercises, she experiences flare-ups when she sits for prolonged periods without taking breaks.    Objective:    Body mass index is 17.89 kg/m².    Sacrum/Coccyx Examination:    Inspection:  No visible deformities or swelling in the sacrococcygeal region.    Palpation:  Tenderness over the sacrococcygeal area.    Range of Motion:  Full range of motion in the lumbar spine without exacerbation of tailbone pain.    Neurovascular:  Intact sensation and motor function in the lower extremities. Normal pulses and capillary refill.    Special Tests:  Seated palpation and posterior pelvic tilt reproduces the patient's pain.    Diagnostic Tests:    X-ray of the sacrum and coccyx reviewed. No evidence of sacrococcygeal fracture or deformity. Sacral foramina are intact. Symmetric degenerative changes observed in the sacroiliac joints. Partial visualization of degenerative changes in the lower lumbar spine and lumbosacral junction. No acute  abnormalities detected.    Assessment:    Chronic Coccyx Pain (Coxalgia) with contributing factors including low BMI and bony prominence in the sacrococcygeal area.    Plan:    Injection: Administered corticosteroid injection to the sacrococcygeal area to address inflammation and pain. Patient advised this is a reactive treatment and may require repeat or alternative interventions if symptoms persist.    Activity Recommendations: Continue using a donut pillow to offload pressure from the tailbone and avoid prolonged sitting. Patient encouraged to take frequent breaks to stand and move.    Advanced Imaging: Consider MRI in the future if there is no significant improvement with current treatment, to further evaluate any potential underlying soft tissue abnormalities.    Referral: If pain persists despite conservative management, consider referral to pain management for potential radiofrequency ablation or neurolysis.    Follow-up: Tentative follow-up scheduled in 3-4 weeks to assess response to injection and adjust the treatment plan as needed. Patient advised to return sooner if symptoms worsen or new symptoms develop.    Ultrasound Guided Procedure Note:    After discussion of the risks and benefits, the patient elected to proceed with a therapeutic injection into the right coccygeal region under US guidance. Confirmed that the patient does not have history of prior adverse reactions, active infections, or relevant allergies. There was no erythema or warmth, and the skin was clear.    The skin was sterilized with ChloraPrep. A 25 gauge needle was inserted via distal approach utilizing US for needle guidance and placement. The site was injected with a mixture of 1 mL of betamethasone 6 mg/mL and 1 mL of 1% Lidocaine without Epinephrine. The injection was completed without complication, and a bandage was applied.    The patient tolerated the procedure well and was instructed to avoid strenuous activity for the next  24-48 hours and to use ice or Tylenol for pain as needed. The patient will call immediately with any signs of infection or allergic reaction.    Post-Injection Care: The patient tolerated the procedure well. An occlusive bandage was placed over the injection site. Post-injection care instructions provided to the patient. The patient was asked to avoid strenuous activity and continue to rest the area for 2-3 days before resuming regular activities. Patient advised that the area may be more painful for the first 1-2 days. They can use ice or Tylenol for pain as needed.  Patient was instructed to watch for fever, increased swelling, or persistent pain. The patient will call immediately with any signs of infection or allergic reaction.    Complications: The patient tolerated the procedure well without any complications.      Barron Moore DO, CAM   Primary Care Sports Medicine    Department of Orthopaedic Surgery  [facility]    [address] [place] [address]  [address] [place] [id number]    t: [phone]  f: [phone]      [facility]

## 2024-08-26 ENCOUNTER — HOSPITAL ENCOUNTER (OUTPATIENT)
Age: 84
Discharge: HOME | End: 2024-08-26
Payer: MEDICARE

## 2024-08-26 ENCOUNTER — APPOINTMENT (OUTPATIENT)
Dept: ULTRASOUND IMAGING | Facility: HOSPITAL | Age: 84
End: 2024-08-26
Attending: NURSE PRACTITIONER
Payer: MEDICARE

## 2024-08-26 ENCOUNTER — TELEPHONE (OUTPATIENT)
Dept: FAMILY MEDICINE CLINIC | Facility: CLINIC | Age: 84
End: 2024-08-26

## 2024-08-26 VITALS
SYSTOLIC BLOOD PRESSURE: 160 MMHG | DIASTOLIC BLOOD PRESSURE: 80 MMHG | HEART RATE: 82 BPM | RESPIRATION RATE: 18 BRPM | TEMPERATURE: 98 F | OXYGEN SATURATION: 98 %

## 2024-08-26 DIAGNOSIS — R31.9 HEMATURIA, UNSPECIFIED TYPE: ICD-10-CM

## 2024-08-26 DIAGNOSIS — N93.9 VAGINAL BLEEDING: Primary | ICD-10-CM

## 2024-08-26 LAB
#MXD IC: 0.7 X10ˆ3/UL (ref 0.1–1)
BILIRUB UR QL STRIP: NEGATIVE
BUN BLD-MCNC: 10 MG/DL (ref 7–18)
CHLORIDE BLD-SCNC: 101 MMOL/L (ref 98–112)
CLARITY UR: CLEAR
CO2 BLD-SCNC: 24 MMOL/L (ref 21–32)
COLOR UR: YELLOW
CREAT BLD-MCNC: 0.8 MG/DL
EGFRCR SERPLBLD CKD-EPI 2021: 74 ML/MIN/1.73M2 (ref 60–?)
GLUCOSE BLD-MCNC: 100 MG/DL (ref 70–99)
GLUCOSE UR STRIP-MCNC: NEGATIVE MG/DL
HCT VFR BLD AUTO: 41.4 %
HCT VFR BLD CALC: 43 %
HGB BLD-MCNC: 13.4 G/DL
ISTAT IONIZED CALCIUM FOR CHEM 8: 1.14 MMOL/L (ref 1.12–1.32)
KETONES UR STRIP-MCNC: NEGATIVE MG/DL
LYMPHOCYTES # BLD AUTO: 1.4 X10ˆ3/UL (ref 1–4)
LYMPHOCYTES NFR BLD AUTO: 20.4 %
MCH RBC QN AUTO: 30.6 PG (ref 26–34)
MCHC RBC AUTO-ENTMCNC: 32.4 G/DL (ref 31–37)
MCV RBC AUTO: 94.5 FL (ref 80–100)
MIXED CELL %: 10.3 %
NEUTROPHILS # BLD AUTO: 5 X10ˆ3/UL (ref 1.5–7.7)
NEUTROPHILS NFR BLD AUTO: 69.3 %
NITRITE UR QL STRIP: NEGATIVE
PH UR STRIP: 6 [PH]
PLATELET # BLD AUTO: 262 X10ˆ3/UL (ref 150–450)
POTASSIUM BLD-SCNC: 3.7 MMOL/L (ref 3.6–5.1)
PROT UR STRIP-MCNC: NEGATIVE MG/DL
RBC # BLD AUTO: 4.38 X10ˆ6/UL
SODIUM BLD-SCNC: 137 MMOL/L (ref 136–145)
SP GR UR STRIP: 1.01
UROBILINOGEN UR STRIP-ACNC: <2 MG/DL
WBC # BLD AUTO: 7.1 X10ˆ3/UL (ref 4–11)

## 2024-08-26 PROCEDURE — 81002 URINALYSIS NONAUTO W/O SCOPE: CPT | Performed by: NURSE PRACTITIONER

## 2024-08-26 PROCEDURE — 99214 OFFICE O/P EST MOD 30 MIN: CPT | Performed by: NURSE PRACTITIONER

## 2024-08-26 PROCEDURE — 76856 US EXAM PELVIC COMPLETE: CPT | Performed by: NURSE PRACTITIONER

## 2024-08-26 PROCEDURE — 76830 TRANSVAGINAL US NON-OB: CPT | Performed by: NURSE PRACTITIONER

## 2024-08-26 PROCEDURE — 80047 BASIC METABLC PNL IONIZED CA: CPT | Performed by: NURSE PRACTITIONER

## 2024-08-26 PROCEDURE — 85025 COMPLETE CBC W/AUTO DIFF WBC: CPT | Performed by: NURSE PRACTITIONER

## 2024-08-26 RX ORDER — NITROFURANTOIN 25; 75 MG/1; MG/1
100 CAPSULE ORAL 2 TIMES DAILY
Qty: 14 CAPSULE | Refills: 0 | Status: SHIPPED | OUTPATIENT
Start: 2024-08-26 | End: 2024-09-02

## 2024-08-26 RX ORDER — NITROFURANTOIN 25; 75 MG/1; MG/1
100 CAPSULE ORAL 2 TIMES DAILY
Qty: 14 CAPSULE | Refills: 0 | Status: SHIPPED | OUTPATIENT
Start: 2024-08-26 | End: 2024-08-26

## 2024-08-26 NOTE — TELEPHONE ENCOUNTER
Pt calling and states she had a tinge of blood in her urine on Saturday   There are no open appointments today     Please advise

## 2024-08-26 NOTE — DISCHARGE INSTRUCTIONS
Go for outpatient us.  Take antibiotic as directed.  We will call with urine culture result.  Close follow up with gynecology.

## 2024-08-26 NOTE — TELEPHONE ENCOUNTER
Pt states Saturday noticed a tinge of blood in urine.  No pain, no frequency.  Advised to go to [facility] for exam/urine test. Pt expressed understanding and  agrees to go

## 2024-08-26 NOTE — ED PROVIDER NOTES
Patient Seen in: [facility]      History     Chief Complaint   Patient presents with    Urinary Symptoms     Stated Complaint: uti symptoms x Friday    Subjective:   HPI  83-year-old female presents complaining of hematuria and vaginal irritation since Friday.  Patient states she last had a UTI a month ago and was given Bactrim.  She states frequent UTIs and hematuria.    Objective:   No pertinent past medical history.            No pertinent past surgical history.              No pertinent social history.            Review of Systems   All other systems reviewed and are negative.      Positive for stated Chief Complaint: Urinary Symptoms    Other systems are as noted in HPI.  Constitutional and vital signs reviewed.      All other systems reviewed and negative except as noted above.    Physical Exam     ED Triage Vitals [08/26/24 0945]   /80   Pulse 82   Resp 18   Temp 97.8 °F (36.6 °C)   Temp src Temporal   SpO2 98 %   O2 Device None (Room air)       Current Vitals:   Vital Signs  BP: 160/80  Pulse: 82  Resp: 18  Temp: 97.8 °F (36.6 °C)  Temp src: Temporal    Oxygen Therapy  SpO2: 98 %  O2 Device: None (Room air)            Physical Exam  Vitals and nursing note reviewed. Exam conducted with a chaperone present (Nathalie ROMO).   Constitutional:       General: She is not in acute distress.     Appearance: She is well-developed. She is not ill-appearing or toxic-appearing.   Cardiovascular:      Rate and Rhythm: Normal rate and regular rhythm.      Heart sounds: Normal heart sounds.   Pulmonary:      Effort: Pulmonary effort is normal.      Breath sounds: Normal breath sounds.   Genitourinary:     Vagina: Vaginal discharge (Brown discharge with blood) present.   Skin:     General: Skin is warm and dry.   Neurological:      Mental Status: She is alert and oriented to person, place, and time.               ED Course     Labs Reviewed   [facility] POCT URINALYSIS DIPSTICK - Abnormal; Notable for the  following components:       Result Value    Blood, Urine Trace-lysed (*)     Leukocyte esterase urine Small (*)     All other components within normal limits   POCT ISTAT CHEM8 CARTRIDGE - Abnormal; Notable for the following components:    ISTAT Glucose 100 (*)     All other components within normal limits   POCT CBC   URINE CULTURE, ROUTINE             US PELVIS W EV (CPT=76856/48984)    Result Date: 8/26/2024  PROCEDURE:  US PELVIS W EV (CPT=76856,85656)  COMPARISON:  [facility], US PELVIS EV (TRNS ABD AND ENDOVAG) (CPT=76856,35529), 5/22/2015, 2:57 PM.  INDICATIONS:  vaginal bleeding  TECHNIQUE:  Pelvic ultrasound using transabdominal and endovaginal technique.  Transvaginal ultrasound was used to better evaluate adnexal and endometrial detail.  PATIENT STATED HISTORY: (As transcribed by Technologist)  Patient is having postmenopausal bleeding.    FINDINGS:            UTERUS:  4.48 cm x 2.48 cm x 4.02 cm   Endometrium Thickness: 0.52 cm   The uterus is anteverted in configuration.  No uterine fibroids identified.  The uterus measures up to 5 mm in thickness demonstrating somewhat heterogeneous echogenicity including a focal echogenic structure of the fundal endometrium measuring 3 x 3 x  3 mm, possibly representing a polyp.  Scattered additional endometrial cystic structures noted, the largest measuring 4 x 2 x 4 mm. RIGHT OVARY:  1.53 cm x 0.79 cm x 1.41 cm   The right ovary appears normal in size, shape, and echogenicity. No significant masses are identified. LEFT OVARY:  Obscured by regional bowel gas. CUL-DE-SAC:  No adnexal mass or free pelvic fluid.  OTHER:  Negative.              CONCLUSION:   1. Anteverted uterus without fibroids.  2. Heterogeneity of the endometrium measuring up to 5 mm in thickness.  Focal echogenic structure of the fundal endometrium (3 x 3 x 3 mm) may represent a polyp or submucosal fibroid.  If postmenopausal bleeding persists, recommend direct visual inspection and/or tissue  sampling.  3. Normal sonographic appearance of the right ovary.  Nonvisualization of the left ovary.    LOCATION:  [facility]   Dictated by (CST): Mariah Domingo MD on 8/26/2024 at 1:33 PM     Finalized by (CST): Mariah Domingo MD on 8/26/2024 at 1:36 PM              [facility]       Medical Decision Making  83-year-old female presents complaining of hematuria and vaginal irritation since Friday.  Patient states she last had a UTI a month ago and was given Bactrim.  She states frequent UTIs and hematuria.    Pertinent Labs & Imaging studies reviewed. (See chart for details).  Patient coming in with UTI symptoms.   Differential diagnosis includes but not limited to UTI, pyelonephritis, kidney stone, dysfunctional uterine bleeding  Labs reviewed urine dip with small leuks and trace blood.  Urine culture sent.  CBC and CHEM are normal. Radiology 1. Anteverted uterus without fibroids.  2. Heterogeneity of the endometrium measuring up to 5 mm in thickness.  Focal echogenic structure of the fundal endometrium (3 x 3 x 3 mm) may represent a polyp or submucosal fibroid.  If postmenopausal bleeding persists, recommend direct visual inspection and/or tissue sampling.  Will treat for vaginal bleeding, hematuria.  Will discharge on Macrobid with close follow-up with gynecology. Patient/Parent is comfortable with this plan.    Overall Pt looks good. Non-toxic, well-hydrated and in no respiratory distress. Vital signs are reassuring. Exam is reassuring. I do not believe pt requires and additional diagnostic studies or intervention. I believe pt can be discharged home to continue evaluation as an outpatient. Follow-up provider given. Discharge instructions given and reviewed. Return for any problems. All understand and agree with the plan.        Problems Addressed:  Hematuria, unspecified type: acute illness or injury  Vaginal bleeding: acute illness or injury        Disposition and Plan     Clinical Impression:  1. Vaginal bleeding    2.  Hematuria, unspecified type         Disposition:  Discharge  8/26/2024 11:03 am    Follow-up:  Cierra Pierce MD  [address] [place] [address]  [phone]    Call             Medications Prescribed:  Discharge Medication List as of 8/26/2024  1:52 PM        START taking these medications    Details   nitrofurantoin monohydrate macro 100 MG Oral Cap Take 1 capsule (100 mg total) by mouth 2 (two) times daily for 7 days., Normal, Disp-14 capsule, R-0

## 2024-08-26 NOTE — ED INITIAL ASSESSMENT (HPI)
Pt c/o pink tinged urine Friday and states \"it's irritated down there\", hx UTI 1x month ago and placed on Bactrim

## 2024-08-28 NOTE — ED NOTES
Attempted to contact the patient regarding urine culture results but was unsuccessful.  Left a message for the patient to call us back. Pt is to stop the abx and f/u with her pcp.

## 2024-09-20 ENCOUNTER — TELEPHONE (OUTPATIENT)
Facility: CLINIC | Age: 84
End: 2024-09-20

## 2024-09-20 ENCOUNTER — OFFICE VISIT (OUTPATIENT)
Facility: CLINIC | Age: 84
End: 2024-09-20
Payer: MEDICARE

## 2024-09-20 VITALS
HEART RATE: 92 BPM | BODY MASS INDEX: 17.89 KG/M2 | SYSTOLIC BLOOD PRESSURE: 138 MMHG | DIASTOLIC BLOOD PRESSURE: 66 MMHG | HEIGHT: 63 IN | WEIGHT: 101 LBS

## 2024-09-20 DIAGNOSIS — N95.0 POSTMENOPAUSAL BLEEDING: Primary | ICD-10-CM

## 2024-09-20 PROCEDURE — 99203 OFFICE O/P NEW LOW 30 MIN: CPT | Performed by: OBSTETRICS & GYNECOLOGY

## 2024-09-20 NOTE — PROGRESS NOTES
Monique Olsen is a 83 year old female  No LMP recorded. Patient is postmenopausal.   Chief Complaint   Patient presents with    Gyn Problem     Discuss ultrasound from 2024  - started spotting light pink on 24  - again on 24  1 week prior to 2024 pt states she had a cortisone injection   .   She stopped her periods in her mid 50s.  In March she urinated and had a brown spot.  She saw her primary was thought to have a UTI and was treated.  Twice since then she has had some pink spotting.  No history of fibroids.  She delivered to that babies vaginally.  No vaginal symptoms.  The bleeding was not after intercourse ultrasound showed a possible polyp.  Hysteroscopy and D&C will be performed.      OBSTETRICS HISTORY:  OB History    Para Term  AB Living   2 2 2     2   SAB IAB Ectopic Multiple Live Births           2      # Outcome Date GA Lbr Alvarez/2nd Weight Sex Type Anes PTL Lv   2 Term  40w0d   F NORMAL SPONT   [place]   1 Term  40w0d   M NORMAL SPONT   MCKINLEY       GYNE HISTORY:  Periods spotting only    History   Sexual Activity    Sexual activity: Not on file                 MEDICAL HISTORY:  Past Medical History:    Abnormal uterine bleeding    Very light bleeding    Dermatographic urticaria    Esophageal reflux    H/O pelvic ultrasound    1. Anteverted uterus without fibroids. 2. Heterogeneity of endometrium measuring up to 5mm in thickness. Focal echogenic structure of fundal endometrium (3 x 3 x 3 mm) may represent a polyp or submucosal fibroid. If postmenopausal bleeding persists, recommend direct visual inspection and/or tissue sampling.  3. Normal sonographic appearance of the right ovary.  Nonvisualization of the left ovary.    Legionnaire's disease (HCC)    Lumbago    Mitral valve prolapse determined by imaging    Personal history of arthritis    Personal history of pneumonia (recurrent)    Sprain of lumbar region    Thyroid disease       SURGICAL  HISTORY:  Past Surgical History:   Procedure Laterality Date    Cholecystectomy      Colonoscopy      Other surgical history      Tonsillectomy         SOCIAL HISTORY:  Social History     Socioeconomic History    Marital status:      Spouse name: Not on file    Number of children: Not on file    Years of education: Not on file    Highest education level: Not on file   Occupational History    Not on file   Tobacco Use    Smoking status: Former     Current packs/day: 0.00     Average packs/day: 1 pack/day for 50.0 years (50.0 ttl pk-yrs)     Types: Cigarettes     Quit date: 1970     Years since quittin.4    Smokeless tobacco: Never   Vaping Use    Vaping status: Never Used   Substance and Sexual Activity    Alcohol use: Not Currently    Drug use: No    Sexual activity: Not on file   Other Topics Concern     Service Not Asked    Blood Transfusions Not Asked    Caffeine Concern No    Occupational Exposure Not Asked    Hobby Hazards Not Asked    Sleep Concern Not Asked    Stress Concern No    Weight Concern No    Special Diet No    Back Care Not Asked    Exercise No    Bike Helmet Not Asked    Seat Belt No    Self-Exams Not Asked   Social History Narrative    Not on file     Social Determinants of Health     Financial Resource Strain: Not on file   Food Insecurity: Not on file   Transportation Needs: Not on file   Physical Activity: Not on file   Stress: Not on file   Social Connections: Not on file   Housing Stability: Not on file       FAMILY HISTORY:  Family History   Problem Relation Age of Onset    Other (prostate disorders) Father     Breast Cancer Mother 76        pt not sure if her mother had breast ca or not, she thinks so though    Arthritis Mother     Other (allergies) Daughter     Other (allergies) Son     Arthritis Maternal Grandmother     No Known Problems Maternal Grandfather     No Known Problems Paternal Grandmother     No Known Problems Paternal Grandfather     Prostate Cancer  Maternal Uncle     Stroke Neg     Heart Disease Neg     Ovarian Cancer Neg     Uterine Cancer Neg     Pancreatic Cancer Neg     Colon Cancer Neg        MEDICATIONS:    Current Outpatient Medications:     MONTELUKAST 10 MG Oral Tab, TAKE 1 TABLET BY MOUTH EVERY DAY AT NIGHT, Disp: 90 tablet, Rfl: 1    LEVOTHYROXINE 50 MCG Oral Tab, TAKE 1 TABLET BY MOUTH BEFORE BREAKFAST., Disp: 90 tablet, Rfl: 0    Fluticasone Propionate (KLS ALLER-FLORENTINO NA), by Nasal route., Disp: , Rfl:     acidophilus-pectin Oral Cap, Take 1 capsule by mouth daily., Disp: , Rfl:     Misc Natural Products (OSTEO BI-FLEX ADV DOUBLE ST OR), Take by mouth., Disp: , Rfl:     ALBUTEROL 108 (90 Base) MCG/ACT Inhalation Aero Soln, INHALE 2 PUFFS INTO THE LUNGS EVERY 6 HOURS AS NEEDED. FOR WHEEZING, Disp: 18 each, Rfl: 1    omega-3 fatty acids 1000 MG Oral Cap, Take 2,000 mg by mouth daily., Disp: 60 capsule, Rfl: 12    ALLERGIES:    Allergies   Allergen Reactions    Erythromycin NAUSEA AND VOMITING    Advil [Ibuprofen]     Aspirin     Benadryl [Altaryl]     Codeine [Opioid Analgesics]     Naproxen     Penicillins          Review of Systems:  Constitutional:  Denies fatigue, night sweats, hot flashes  Gastrointestinal:  denies heartburn, abdominal pain, diarrhea or constipation  Genitourinary:  denies dysuria, incontinence, abnormal vaginal discharge, vaginal itching    Neurological:  denies headaches, extremity weakness or numbness.      PHYSICAL EXAM:   Constitutional: well developed, well nourished  Head/Face: normocephalic    Skin/Hair: no unusual rashes or bruises   Extremities: no edema, no cyanosis  Psychiatric:  Oriented to time, place, person and situation. Appropriate mood and affect    Pelvic Exam:  External Genitalia: normal appearance, hair distribution, and no lesions  Urethral Meatus:  normal in size, location, without lesions and prolapse  Bladder:  No fullness, masses or tenderness  Vagina:  Normal appearance without lesions, no abnormal  discharge  Cervix:  Normal without tenderness on motion  Uterus: normal in size, contour, position, mobility, without tenderness  Adnexa: normal without masses or tenderness  Perineum: normal  Anus: no hemorroids     Assessment & Plan:  There are no diagnoses linked to this encounter.    Postmenopausal bleeding with possible polyp and cystic areas on ultrasound.  Hysteroscopy and D&C.

## 2024-09-20 NOTE — TELEPHONE ENCOUNTER
Patient called to talk to a nurse with questions on her scheduled hysteroscopy on 10/10/24 and how she should prepare.

## 2024-09-20 NOTE — TELEPHONE ENCOUNTER
----- Message from Valencia Jang sent at 9/20/2024  9:31 AM CDT -----  Regarding: Schedule Surgery   Hysteroscopy D&C and Pap smear

## 2024-09-20 NOTE — TELEPHONE ENCOUNTER
Spoke with patient. Aware PAT will call her regarding how to prepare for her procedure. Had an 2 D echo done in August & routine lab work, including a CBC & CMP on 10/2/24. Should not need to be repeated but PAT will call her with instructions. Verbalized understanding.

## 2024-10-07 ENCOUNTER — LAB ENCOUNTER (OUTPATIENT)
Dept: LAB | Age: 84
End: 2024-10-07
Attending: FAMILY MEDICINE
Payer: MEDICARE

## 2024-10-07 ENCOUNTER — OFFICE VISIT (OUTPATIENT)
Dept: FAMILY MEDICINE CLINIC | Facility: CLINIC | Age: 84
End: 2024-10-07
Payer: MEDICARE

## 2024-10-07 ENCOUNTER — LABORATORY ENCOUNTER (OUTPATIENT)
Dept: LAB | Age: 84
End: 2024-10-07
Attending: OBSTETRICS & GYNECOLOGY
Payer: MEDICARE

## 2024-10-07 VITALS
OXYGEN SATURATION: 98 % | RESPIRATION RATE: 16 BRPM | HEIGHT: 63 IN | DIASTOLIC BLOOD PRESSURE: 70 MMHG | HEART RATE: 91 BPM | WEIGHT: 101 LBS | BODY MASS INDEX: 17.89 KG/M2 | SYSTOLIC BLOOD PRESSURE: 122 MMHG

## 2024-10-07 DIAGNOSIS — Z00.00 ENCOUNTER FOR ANNUAL HEALTH EXAMINATION: ICD-10-CM

## 2024-10-07 DIAGNOSIS — Z13.6 SCREENING FOR CARDIOVASCULAR CONDITION: ICD-10-CM

## 2024-10-07 DIAGNOSIS — E03.8 OTHER SPECIFIED HYPOTHYROIDISM: ICD-10-CM

## 2024-10-07 DIAGNOSIS — Z00.00 ENCOUNTER FOR ANNUAL HEALTH EXAMINATION: Primary | ICD-10-CM

## 2024-10-07 DIAGNOSIS — N95.0 PMB (POSTMENOPAUSAL BLEEDING): ICD-10-CM

## 2024-10-07 DIAGNOSIS — Z01.818 PRE-OP TESTING: ICD-10-CM

## 2024-10-07 DIAGNOSIS — Z13.0 SCREENING FOR DEFICIENCY ANEMIA: ICD-10-CM

## 2024-10-07 DIAGNOSIS — J45.20 MILD INTERMITTENT ASTHMA WITHOUT COMPLICATION (HCC): ICD-10-CM

## 2024-10-07 LAB
ALBUMIN SERPL-MCNC: 4.7 G/DL (ref 3.2–4.8)
ALBUMIN/GLOB SERPL: 1.8 {RATIO} (ref 1–2)
ALP LIVER SERPL-CCNC: 100 U/L
ALT SERPL-CCNC: 22 U/L
ANION GAP SERPL CALC-SCNC: 8 MMOL/L (ref 0–18)
AST SERPL-CCNC: 34 U/L (ref ?–34)
BASOPHILS # BLD AUTO: 0.06 X10(3) UL (ref 0–0.2)
BASOPHILS NFR BLD AUTO: 1 %
BILIRUB SERPL-MCNC: 1.6 MG/DL (ref 0.2–1.1)
BUN BLD-MCNC: 11 MG/DL (ref 9–23)
CALCIUM BLD-MCNC: 9.7 MG/DL (ref 8.7–10.4)
CHLORIDE SERPL-SCNC: 104 MMOL/L (ref 98–112)
CHOLEST SERPL-MCNC: 167 MG/DL (ref ?–200)
CO2 SERPL-SCNC: 24 MMOL/L (ref 21–32)
CREAT BLD-MCNC: 0.86 MG/DL
EGFRCR SERPLBLD CKD-EPI 2021: 68 ML/MIN/1.73M2 (ref 60–?)
EOSINOPHIL # BLD AUTO: 0.07 X10(3) UL (ref 0–0.7)
EOSINOPHIL NFR BLD AUTO: 1.1 %
ERYTHROCYTE [DISTWIDTH] IN BLOOD BY AUTOMATED COUNT: 12.9 %
FASTING PATIENT LIPID ANSWER: YES
FASTING STATUS PATIENT QL REPORTED: YES
GLOBULIN PLAS-MCNC: 2.6 G/DL (ref 2–3.5)
GLUCOSE BLD-MCNC: 92 MG/DL (ref 70–99)
HCT VFR BLD AUTO: 41.5 %
HDLC SERPL-MCNC: 57 MG/DL (ref 40–59)
HGB BLD-MCNC: 13.9 G/DL
IMM GRANULOCYTES # BLD AUTO: 0.01 X10(3) UL (ref 0–1)
IMM GRANULOCYTES NFR BLD: 0.2 %
LDLC SERPL CALC-MCNC: 97 MG/DL (ref ?–100)
LYMPHOCYTES # BLD AUTO: 1.24 X10(3) UL (ref 1–4)
LYMPHOCYTES NFR BLD AUTO: 20.4 %
MCH RBC QN AUTO: 32.2 PG (ref 26–34)
MCHC RBC AUTO-ENTMCNC: 33.5 G/DL (ref 31–37)
MCV RBC AUTO: 96.1 FL
MONOCYTES # BLD AUTO: 0.61 X10(3) UL (ref 0.1–1)
MONOCYTES NFR BLD AUTO: 10 %
NEUTROPHILS # BLD AUTO: 4.1 X10 (3) UL (ref 1.5–7.7)
NEUTROPHILS # BLD AUTO: 4.1 X10(3) UL (ref 1.5–7.7)
NEUTROPHILS NFR BLD AUTO: 67.3 %
NONHDLC SERPL-MCNC: 110 MG/DL (ref ?–130)
OSMOLALITY SERPL CALC.SUM OF ELEC: 281 MOSM/KG (ref 275–295)
PLATELET # BLD AUTO: 227 10(3)UL (ref 150–450)
POTASSIUM SERPL-SCNC: 5.4 MMOL/L (ref 3.5–5.1)
PROT SERPL-MCNC: 7.3 G/DL (ref 5.7–8.2)
RBC # BLD AUTO: 4.32 X10(6)UL
SODIUM SERPL-SCNC: 136 MMOL/L (ref 136–145)
T4 FREE SERPL-MCNC: 1.3 NG/DL (ref 0.8–1.7)
TRIGL SERPL-MCNC: 69 MG/DL (ref 30–149)
TSI SER-ACNC: 2.71 MIU/ML (ref 0.55–4.78)
VLDLC SERPL CALC-MCNC: 11 MG/DL (ref 0–30)
WBC # BLD AUTO: 6.1 X10(3) UL (ref 4–11)

## 2024-10-07 PROCEDURE — 36415 COLL VENOUS BLD VENIPUNCTURE: CPT

## 2024-10-07 PROCEDURE — 85025 COMPLETE CBC W/AUTO DIFF WBC: CPT

## 2024-10-07 PROCEDURE — 84439 ASSAY OF FREE THYROXINE: CPT

## 2024-10-07 PROCEDURE — 84443 ASSAY THYROID STIM HORMONE: CPT

## 2024-10-07 PROCEDURE — 80061 LIPID PANEL: CPT

## 2024-10-07 PROCEDURE — 80053 COMPREHEN METABOLIC PANEL: CPT

## 2024-10-07 RX ORDER — LEVOTHYROXINE SODIUM 50 UG/1
50 TABLET ORAL
Qty: 90 TABLET | Refills: 3 | Status: SHIPPED | OUTPATIENT
Start: 2024-10-07

## 2024-10-07 RX ORDER — ALBUTEROL SULFATE 90 UG/1
2 INHALANT RESPIRATORY (INHALATION) EVERY 6 HOURS PRN
Qty: 18 EACH | Refills: 1 | Status: SHIPPED | OUTPATIENT
Start: 2024-10-07

## 2024-10-07 RX ORDER — MONTELUKAST SODIUM 10 MG/1
10 TABLET ORAL NIGHTLY
Qty: 90 TABLET | Refills: 1 | Status: SHIPPED | OUTPATIENT
Start: 2024-10-07

## 2024-10-07 NOTE — PROGRESS NOTES
Subjective:   Monique Olsen is a 83 year old female who presents for a Medicare Subsequent Annual Wellness visit (Pt already had Initial Annual Wellness) and scheduled follow up of multiple significant but stable problems.   Monique is getting a hysteroscopy and D&C this week.    Somewhat nervous about that.    No other particular concerns or worries. Can get nauseated with anesthesia.    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    Same home for 26 years.          History/Other:   Fall Risk Assessment:   She has been screened for Falls and is low risk.      Cognitive Assessment:   She had a completely normal cognitive assessment - see flowsheet entries       Functional Ability/Status:   Monique Olsen has a completely normal functional assessment. See flowsheet for details.        Depression Screening (PHQ):  PHQ-2 SCORE: 0  , done 9/30/2024        Advanced Directives:   She does have a Living Will but we do NOT have it on file in Epic.    She does have a POA but we do NOT have it on file in Epic.    Discussed Advance Care Planning with patient (and family/surrogate if present). Standard forms made available to patient in After Visit Summary.      Patient Active Problem List   Diagnosis    Asthma (HCC)    Primary localized osteoarthrosis, hand    Hypothyroid    Allergic rhinitis due to allergen    Mitral valve prolapse determined by imaging    Osteoarthritis of right knee    Primary osteoarthritis of both hips    Postmenopausal bleeding     Allergies:  She is allergic to penicillins, benadryl [altaryl], codeine [opioid analgesics], erythromycin, advil [ibuprofen], aspirin, and naproxen.    Current Medications:  Outpatient Medications Marked as Taking for the 10/7/24 encounter (Office Visit) with Uriel Mracus MD   Medication Sig    levothyroxine 50 MCG Oral Tab Take 1 tablet (50 mcg total) by mouth before  breakfast.    montelukast 10 MG Oral Tab Take 1 tablet (10 mg total) by mouth nightly.    albuterol 108 (90 Base) MCG/ACT Inhalation Aero Soln Inhale 2 puffs into the lungs every 6 (six) hours as needed for Wheezing.    Fluticasone Propionate (KLS ALLER-FLORENTINO NA) 1 spray by Nasal route daily.    acidophilus-pectin Oral Cap Take 1 capsule by mouth 2 (two) times daily.    Misc Natural Products (OSTEO BI-FLEX ADV DOUBLE ST OR) Take 1 tablet by mouth daily.    omega-3 fatty acids 1000 MG Oral Cap Take 2,000 mg by mouth daily.       Medical History:  She  has a past medical history of Abnormal uterine bleeding (8/17/24), Anesthesia complication, Asthma (HCC), Dermatographic urticaria, Disorder of thyroid, Esophageal reflux, H/O pelvic ultrasound (08/26/2024), Heart valve disease, motion sickness, Legionnaire's disease (HCC), Lumbago, Mitral valve prolapse determined by imaging (10/01/2016), Osteoarthritis, Personal history of arthritis, Personal history of pneumonia (recurrent), PONV (postoperative nausea and vomiting), Sprain of lumbar region, Thyroid disease, and Visual impairment.  Surgical History:  She  has a past surgical history that includes cholecystectomy; tonsillectomy; colonoscopy; and other surgical history.   Family History:  Her family history includes Arthritis in her maternal grandmother and mother; Breast Cancer (age of onset: 76) in her mother; No Known Problems in her maternal grandfather, paternal grandfather, and paternal grandmother; Prostate Cancer in her maternal uncle; allergies in her daughter and son; prostate disorders in her father.  Social History:  She  reports that she quit smoking about 54 years ago. Her smoking use included cigarettes. She has a 50 pack-year smoking history. She has never used smokeless tobacco. She reports that she does not currently use alcohol. She reports that she does not use drugs.    Tobacco:  She smoked tobacco in the past but quit greater than 12 months  ago.  Social History     Tobacco Use   Smoking Status Former    Current packs/day: 0.00    Average packs/day: 1 pack/day for 50.0 years (50.0 ttl pk-yrs)    Types: Cigarettes    Quit date: 1970    Years since quittin.4   Smokeless Tobacco Never          CAGE Alcohol Screen:   CAGE screening score of 0 on 2024, showing low risk of alcohol abuse.      Patient Care Team:  Uriel Marcus MD as PCP - General (Family Medicine)  Jason Pineda MD (CARDIOLOGY)    Review of Systems  Negative except some vision changes, but follows with ophthalmology. Has asthma, weather or cold or humid weather can trigger it. Sees Cardiologist Dr. Pineda in [place], and gets EKG and and ECHO yearly. Stable (mitral valve prolapse)    Objective:   Physical Exam  General Appearance:  Alert, cooperative, no distress, appears stated age   Head:  Normocephalic, without obvious abnormality, atraumatic   Eyes:  PERRL, conjunctiva/corneas clear, EOM's intact both eyes   Ears:  Normal TM's and external ear canals, both ears   Nose: Nares normal, septum midline,mucosa normal, no drainage or sinus tenderness   Throat: Lips, mucosa, and tongue normal; teeth and gums normal   Neck: Supple, symmetrical, trachea midline, no adenopathy;  thyroid: not enlarged, symmetric, no tenderness/mass/nodules; no carotid bruit or JVD   Back:   Symmetric, no curvature, ROM normal, no CVA tenderness   Lungs:   Clear to auscultation bilaterally, respirations unlabored   Heart:  Regular rate and rhythm, S1 and S2 normal, no murmur, rub, or gallop   Abdomen:   Soft, non-tender, bowel sounds active all four quadrants,  no masses, no organomegaly   Pelvic: Deferred   Extremities: Extremities normal, atraumatic, no cyanosis or edema   Pulses: 2+ and symmetric   Skin: Skin color, texture, turgor normal, no rashes or lesions   Lymph nodes: Cervical, supraclavicular, and axillary nodes normal   Neurologic: Normal       /70   Pulse 91   Resp 16   Ht 5' 3\"  (1.6 m)   Wt 101 lb (45.8 kg)   SpO2 98%   BMI 17.89 kg/m²  Estimated body mass index is 17.89 kg/m² as calculated from the following:    Height as of this encounter: 5' 3\" (1.6 m).    Weight as of this encounter: 101 lb (45.8 kg).    Medicare Hearing Assessment:   Hearing Screening    Screening Method: Finger Rub  Finger Rub Result: Pass         Visual Acuity:   Right Eye Visual Acuity: Uncorrected Right Eye Chart Acuity: 20/40   Left Eye Visual Acuity: Uncorrected Left Eye Chart Acuity: 20/40   Both Eyes Visual Acuity: Uncorrected Both Eyes Chart Acuity: 20/25   Able To Tolerate Visual Acuity: Yes        Assessment & Plan:   Monique Olsen is a 83 year old female who presents for a Medicare Assessment.     1. Encounter for annual health examination (Primary)  -     Lipid Panel; Future; Expected date: 10/07/2024  -     Comp Metabolic Panel (14); Future; Expected date: 10/07/2024  -     CBC With Differential With Platelet; Future; Expected date: 10/07/2024  2. Mild intermittent asthma without complication (HCC) - Controlled, will refill medications  -     Montelukast Sodium; Take 1 tablet (10 mg total) by mouth nightly.  Dispense: 90 tablet; Refill: 1  -     Albuterol Sulfate HFA; Inhale 2 puffs into the lungs every 6 (six) hours as needed for Wheezing.  Dispense: 18 each; Refill: 1  3. Other specified hypothyroidism - Controlled, will recheck labs  -     TSH and Free T4; Future; Expected date: 10/07/2024  -     Levothyroxine Sodium; Take 1 tablet (50 mcg total) by mouth before breakfast.  Dispense: 90 tablet; Refill: 3  4. Screening for deficiency anemia  -     CBC With Differential With Platelet; Future; Expected date: 10/07/2024  5. Screening for cardiovascular condition  -     Lipid Panel; Future; Expected date: 10/07/2024    Doesn't appear she needs preop, but ok for surgery.    Will get screening labs.    The patient indicates understanding of these issues and agrees to the plan.  Lab work  ordered.  Reinforced healthy diet, lifestyle, and exercise.      No follow-ups on file.     Uriel Marcus MD, 10/7/2024     Supplementary Documentation:   General Health:  In the past six months, have you lost more than 10 pounds without trying?: 2 - No  Has your appetite been poor?: No  Type of Diet: Balanced  How does the patient maintain a good energy level?: Appropriate Exercise;Daily Walks;Stretching  How would you describe your daily physical activity?: Moderate  How would you describe your current health state?: Good  How do you maintain positive mental well-being?: Social Interaction;Puzzles;Games;Visiting Friends;Visiting Family  On a scale of 0 to 10, with 0 being no pain and 10 being severe pain, what is your pain level?: 4 - (Moderate)  In the past six months, have you experienced urine leakage?: 0-No  At any time do you feel concerned for the safety/well-being of yourself and/or your children, in your home or elsewhere?: No  Have you had any immunizations at another office such as Influenza, Hepatitis B, Tetanus, or Pneumococcal?: No    Health Maintenance   Topic Date Due    DEXA Scan  Never done    Zoster Vaccines (2 of 3) 02/26/2012    Annual Depression Screening  01/01/2024    COVID-19 Vaccine (5 - 2023-24 season) 09/01/2024    Influenza Vaccine (1) 10/01/2024    Annual Physical  10/02/2024    Fall Risk Screening (Annual)  Completed    Pneumococcal Vaccine: 65+ Years  Completed

## 2024-10-07 NOTE — H&P
[facility]    PATIENT'S NAME: CHETAN COPELAND   ATTENDING PHYSICIAN: Valencia Jang M.D.   PATIENT ACCOUNT#:   [id number]    LOCATION:    MEDICAL RECORD #:   [id number]       YOB: 1940  ADMISSION DATE:       10/10/2024    HISTORY AND PHYSICAL EXAMINATION    HISTORY OF PRESENT ILLNESS:  The patient is an 83-year-old, G2, P2; is postmenopausal.  She had light pink spotting in August of this year and again in September.  She stopped her periods in her mid 50s.  Two vaginal deliveries.  Ultrasound showed a uterus that is 4.48 cm, endometrial thickness is 0.52 cm.  No fibroids were noticed.  There is a heterogeneous echogenicity including a focal echogenic structure in the fundus of the endometrium measuring 3 x 3 x 3 mm, possibly a polyp.  There are also scattered cystic structures noted in the endometrium.  Ovaries were normal.    PAST MEDICAL HISTORY:  She had 2 vaginal deliveries.  Thyroid problems.      PAST SURGICAL HISTORY:  Tonsillectomy, colonoscopy, and cholecystectomy.    PHYSICAL EXAMINATION:    GENERAL:  Well-developed, well-nourished female.  LUNGS:  Clear.  HEART:  Regular rate and rhythm.  ABDOMEN:  Soft.    PELVIC:  Deferred.      ASSESSMENT:  Postmenopausal bleeding and possible endometrial polyp and cystic areas on ultrasound       PLAN:  Hysteroscopy and D and C.    Dictated By Valencia Jang M.D.  d: 10/07/2024 11:06:15  t: 10/07/2024 13:07:04  Job [id number]  KLD/

## 2024-10-10 ENCOUNTER — ANESTHESIA EVENT (OUTPATIENT)
Dept: SURGERY | Facility: HOSPITAL | Age: 84
End: 2024-10-10
Payer: MEDICARE

## 2024-10-10 ENCOUNTER — HOSPITAL ENCOUNTER (OUTPATIENT)
Facility: HOSPITAL | Age: 84
Setting detail: HOSPITAL OUTPATIENT SURGERY
Discharge: HOME | End: 2024-10-10
Attending: OBSTETRICS & GYNECOLOGY | Admitting: OBSTETRICS & GYNECOLOGY
Payer: MEDICARE

## 2024-10-10 ENCOUNTER — ANESTHESIA (OUTPATIENT)
Dept: SURGERY | Facility: HOSPITAL | Age: 84
End: 2024-10-10
Payer: MEDICARE

## 2024-10-10 VITALS
HEIGHT: 63 IN | WEIGHT: 101.63 LBS | SYSTOLIC BLOOD PRESSURE: 156 MMHG | RESPIRATION RATE: 16 BRPM | HEART RATE: 55 BPM | BODY MASS INDEX: 18.01 KG/M2 | OXYGEN SATURATION: 96 % | DIASTOLIC BLOOD PRESSURE: 69 MMHG | TEMPERATURE: 98 F

## 2024-10-10 DIAGNOSIS — Z01.818 PRE-OP TESTING: ICD-10-CM

## 2024-10-10 DIAGNOSIS — N95.0 PMB (POSTMENOPAUSAL BLEEDING): Primary | ICD-10-CM

## 2024-10-10 DIAGNOSIS — N95.0 POSTMENOPAUSAL BLEEDING: ICD-10-CM

## 2024-10-10 PROCEDURE — 58558 HYSTEROSCOPY BIOPSY: CPT | Performed by: OBSTETRICS & GYNECOLOGY

## 2024-10-10 PROCEDURE — 0UDB8ZX EXTRACTION OF ENDOMETRIUM, VIA NATURAL OR ARTIFICIAL OPENING ENDOSCOPIC, DIAGNOSTIC: ICD-10-PCS | Performed by: OBSTETRICS & GYNECOLOGY

## 2024-10-10 RX ORDER — HYDROMORPHONE HYDROCHLORIDE 1 MG/ML
0.2 INJECTION, SOLUTION INTRAMUSCULAR; INTRAVENOUS; SUBCUTANEOUS EVERY 5 MIN PRN
Status: DISCONTINUED | OUTPATIENT
Start: 2024-10-10 | End: 2024-10-10

## 2024-10-10 RX ORDER — HYDROCODONE BITARTRATE AND ACETAMINOPHEN 5; 325 MG/1; MG/1
2 TABLET ORAL ONCE AS NEEDED
Status: DISCONTINUED | OUTPATIENT
Start: 2024-10-10 | End: 2024-10-10

## 2024-10-10 RX ORDER — ACETAMINOPHEN 500 MG
1000 TABLET ORAL ONCE AS NEEDED
Status: DISCONTINUED | OUTPATIENT
Start: 2024-10-10 | End: 2024-10-10

## 2024-10-10 RX ORDER — HYDROMORPHONE HYDROCHLORIDE 1 MG/ML
0.6 INJECTION, SOLUTION INTRAMUSCULAR; INTRAVENOUS; SUBCUTANEOUS EVERY 5 MIN PRN
Status: DISCONTINUED | OUTPATIENT
Start: 2024-10-10 | End: 2024-10-10

## 2024-10-10 RX ORDER — DIPHENHYDRAMINE HYDROCHLORIDE 50 MG/ML
12.5 INJECTION INTRAMUSCULAR; INTRAVENOUS AS NEEDED
Status: DISCONTINUED | OUTPATIENT
Start: 2024-10-10 | End: 2024-10-10

## 2024-10-10 RX ORDER — ONDANSETRON 2 MG/ML
INJECTION INTRAMUSCULAR; INTRAVENOUS AS NEEDED
Status: DISCONTINUED | OUTPATIENT
Start: 2024-10-10 | End: 2024-10-10 | Stop reason: SURG

## 2024-10-10 RX ORDER — HYDROCODONE BITARTRATE AND ACETAMINOPHEN 5; 325 MG/1; MG/1
1 TABLET ORAL ONCE AS NEEDED
Status: DISCONTINUED | OUTPATIENT
Start: 2024-10-10 | End: 2024-10-10

## 2024-10-10 RX ORDER — ONDANSETRON 2 MG/ML
4 INJECTION INTRAMUSCULAR; INTRAVENOUS EVERY 6 HOURS PRN
Status: DISCONTINUED | OUTPATIENT
Start: 2024-10-10 | End: 2024-10-10

## 2024-10-10 RX ORDER — LIDOCAINE HYDROCHLORIDE 10 MG/ML
INJECTION, SOLUTION EPIDURAL; INFILTRATION; INTRACAUDAL; PERINEURAL AS NEEDED
Status: DISCONTINUED | OUTPATIENT
Start: 2024-10-10 | End: 2024-10-10 | Stop reason: SURG

## 2024-10-10 RX ORDER — DEXAMETHASONE SODIUM PHOSPHATE 4 MG/ML
VIAL (ML) INJECTION AS NEEDED
Status: DISCONTINUED | OUTPATIENT
Start: 2024-10-10 | End: 2024-10-10 | Stop reason: SURG

## 2024-10-10 RX ORDER — SODIUM CHLORIDE, SODIUM LACTATE, POTASSIUM CHLORIDE, CALCIUM CHLORIDE 600; 310; 30; 20 MG/100ML; MG/100ML; MG/100ML; MG/100ML
INJECTION, SOLUTION INTRAVENOUS CONTINUOUS
Status: DISCONTINUED | OUTPATIENT
Start: 2024-10-10 | End: 2024-10-10

## 2024-10-10 RX ORDER — LABETALOL HYDROCHLORIDE 5 MG/ML
10 INJECTION, SOLUTION INTRAVENOUS EVERY 10 MIN PRN
Status: DISCONTINUED | OUTPATIENT
Start: 2024-10-10 | End: 2024-10-10

## 2024-10-10 RX ORDER — NALOXONE HYDROCHLORIDE 0.4 MG/ML
80 INJECTION, SOLUTION INTRAMUSCULAR; INTRAVENOUS; SUBCUTANEOUS AS NEEDED
Status: DISCONTINUED | OUTPATIENT
Start: 2024-10-10 | End: 2024-10-10

## 2024-10-10 RX ORDER — KETOROLAC TROMETHAMINE 30 MG/ML
15 INJECTION, SOLUTION INTRAMUSCULAR; INTRAVENOUS ONCE
Status: COMPLETED | OUTPATIENT
Start: 2024-10-10 | End: 2024-10-10

## 2024-10-10 RX ORDER — METOCLOPRAMIDE HYDROCHLORIDE 5 MG/ML
5 INJECTION INTRAMUSCULAR; INTRAVENOUS EVERY 8 HOURS PRN
Status: DISCONTINUED | OUTPATIENT
Start: 2024-10-10 | End: 2024-10-10

## 2024-10-10 RX ORDER — ACETAMINOPHEN 500 MG
1000 TABLET ORAL EVERY 6 HOURS PRN
COMMUNITY

## 2024-10-10 RX ORDER — ACETAMINOPHEN 500 MG
1000 TABLET ORAL ONCE
Status: DISCONTINUED | OUTPATIENT
Start: 2024-10-10 | End: 2024-10-10 | Stop reason: HOSPADM

## 2024-10-10 RX ORDER — HYDROMORPHONE HYDROCHLORIDE 1 MG/ML
0.4 INJECTION, SOLUTION INTRAMUSCULAR; INTRAVENOUS; SUBCUTANEOUS EVERY 5 MIN PRN
Status: DISCONTINUED | OUTPATIENT
Start: 2024-10-10 | End: 2024-10-10

## 2024-10-10 RX ORDER — MIDAZOLAM HYDROCHLORIDE 1 MG/ML
1 INJECTION INTRAMUSCULAR; INTRAVENOUS EVERY 5 MIN PRN
Status: DISCONTINUED | OUTPATIENT
Start: 2024-10-10 | End: 2024-10-10

## 2024-10-10 RX ADMIN — LIDOCAINE HYDROCHLORIDE 25 MG: 10 INJECTION, SOLUTION EPIDURAL; INFILTRATION; INTRACAUDAL; PERINEURAL at 07:36:00

## 2024-10-10 RX ADMIN — DEXAMETHASONE SODIUM PHOSPHATE 4 MG: 4 MG/ML VIAL (ML) INJECTION at 07:40:00

## 2024-10-10 RX ADMIN — ONDANSETRON 4 MG: 2 INJECTION INTRAMUSCULAR; INTRAVENOUS at 07:40:00

## 2024-10-10 RX ADMIN — SODIUM CHLORIDE, SODIUM LACTATE, POTASSIUM CHLORIDE, CALCIUM CHLORIDE: 600; 310; 30; 20 INJECTION, SOLUTION INTRAVENOUS at 07:33:00

## 2024-10-10 RX ADMIN — SODIUM CHLORIDE, SODIUM LACTATE, POTASSIUM CHLORIDE, CALCIUM CHLORIDE: 600; 310; 30; 20 INJECTION, SOLUTION INTRAVENOUS at 08:14:00

## 2024-10-10 NOTE — ANESTHESIA PREPROCEDURE EVALUATION
PRE-OP EVALUATION    Patient Name: Monique Olsen    Admit Diagnosis: Postmenopausal bleeding [N95.0]    Pre-op Diagnosis: Postmenopausal bleeding [N95.0]    HYSTEROSCOPY DILATION AND CURETTAGE, and Papanicolaou Smear    Anesthesia Procedure: HYSTEROSCOPY DILATION AND CURETTAGE, and Papanicolaou Smear    Surgeons and Role:     * Valencia Jang MD - Primary    Pre-op vitals reviewed.  Temp: 98.1 °F (36.7 °C)  Pulse: 69  Resp: 16  BP: 144/88  SpO2: 99 %  Body mass index is 18 kg/m².    Current medications reviewed.  Hospital Medications:   acetaminophen (Tylenol Extra Strength) tab 1,000 mg  1,000 mg Oral Once    lactated ringers infusion   Intravenous Continuous       Outpatient Medications:   Prescriptions Prior to Admission[1]    Allergies: Penicillins, Benadryl [altaryl], Codeine [opioid analgesics], Erythromycin, Advil [ibuprofen], Aspirin, and Naproxen      Anesthesia Evaluation    Patient summary reviewed.    Anesthetic Complications  (+) history of anesthetic complications  History of: PONV       GI/Hepatic/Renal      (+) GERD                           Cardiovascular    Negative cardiovascular ROS.    Exercise tolerance: good     MET: >4                                           Endo/Other           (+) hypothyroidism                       Pulmonary      (+) asthma                     Neuro/Psych    Negative neuro/psych ROS.                                  Past Surgical History:   Procedure Laterality Date    Cholecystectomy      Colonoscopy      Other surgical history      Tonsillectomy       Social History     Socioeconomic History    Marital status:    Tobacco Use    Smoking status: Former     Current packs/day: 0.00     Average packs/day: 1 pack/day for 50.0 years (50.0 ttl pk-yrs)     Types: Cigarettes     Quit date: 1970     Years since quittin.4    Smokeless tobacco: Never   Vaping Use    Vaping status: Never Used   Substance and Sexual Activity    Alcohol use: Not Currently    Drug  use: No   Other Topics Concern    Caffeine Concern No    Stress Concern No    Weight Concern No    Special Diet No    Exercise No    Seat Belt No     History   Drug Use No     Available pre-op labs reviewed.  Lab Results   Component Value Date    WBC 6.1 10/07/2024    RBC 4.32 10/07/2024    HGB 13.9 10/07/2024    HCT 41.5 10/07/2024    MCV 96.1 10/07/2024    MCH 32.2 10/07/2024    MCHC 33.5 10/07/2024    RDW 12.9 10/07/2024    .0 10/07/2024     Lab Results   Component Value Date     10/07/2024    K 5.4 (H) 10/07/2024     10/07/2024    CO2 24.0 10/07/2024    BUN 11 10/07/2024    CREATSERUM 0.86 10/07/2024    GLU 92 10/07/2024    CA 9.7 10/07/2024            Airway      Mallampati: II  Mouth opening: >3 FB  TM distance: > 6 cm  Neck ROM: full Cardiovascular    Cardiovascular exam normal.  Rhythm: regular  Rate: normal     Dental    Dentition appears grossly intact         Pulmonary    Pulmonary exam normal.  Breath sounds clear to auscultation bilaterally.               Other findings              ASA: 2   Plan: MAC  NPO status verified and patient meets guidelines.        Comment: MAC discussed. Detailed discussion of PONV risk and mitigating measures to take. All questions answered.  Patient expressed understanding and agrees to proceed.    Plan/risks discussed with: patient and spouse                Present on Admission:  **None**             [1]   Facility-Administered Medications Prior to Admission   Medication Dose Route Frequency Provider Last Rate Last Admin    [COMPLETED] betamethasone sodium phosphate & acetate (Celestone) 6 (3-3) MG/ML injection 6 mg  6 mg Intra-articular Once    6 mg at 08/13/24 1243     Medications Prior to Admission   Medication Sig Dispense Refill Last Dose/Taking    acetaminophen 500 MG Oral Tab Take 2 tablets (1,000 mg total) by mouth every 6 (six) hours as needed for Pain.   10/10/2024 at  3:30 AM    levothyroxine 50 MCG Oral Tab Take 1 tablet (50 mcg total) by  mouth before breakfast. 90 tablet 3 10/9/2024    montelukast 10 MG Oral Tab Take 1 tablet (10 mg total) by mouth nightly. 90 tablet 1 Past Week    albuterol 108 (90 Base) MCG/ACT Inhalation Aero Soln Inhale 2 puffs into the lungs every 6 (six) hours as needed for Wheezing. 18 each 1 Past Month    Fluticasone Propionate (KLS ALLER-FLORENTINO NA) 1 spray by Nasal route daily.   Past Week    acidophilus-pectin Oral Cap Take 1 capsule by mouth 2 (two) times daily.   Past Week    Misc Natural Products (OSTEO BI-FLEX ADV DOUBLE ST OR) Take 1 tablet by mouth daily.   Past Week    omega-3 fatty acids 1000 MG Oral Cap Take 2,000 mg by mouth daily. 60 capsule 12 Past Week

## 2024-10-10 NOTE — DISCHARGE INSTRUCTIONS
Home Care Instructions  DILATATION AND CURETTAGE (D&C)      1. Take it easy for the first 24 hours. Stay at home if possible and make sure someone is at home to help you or to report any symptoms or problems that you might have.    2. You may feel weak, dizzy, or a little lightheaded from the anesthesia during the first 24 hours. If so, stay in bed and rest and do not climb up and down the stairs. If you cannot completely avoid stairs, make sure that you have assistance.    3. Do not drive for 24 hours after surgery.    4. You may have spotting or discharge for the next few days. However, any heavy bleeding is abnormal and should be reported immediately.    5. Take you temperature once a day for the next 7 days and report to us if your temperature is up to 101 degrees or more in the absence of any common cold symptoms.    6. You may have some cramping, lower abdominal pains or shoulder pain for the first 24 hours. Usually two aspirin, Advil Ibuprofen or Tylenol every 4-6 hours takes care of this problem. If the pain persists or gets worse, notify the doctor immediately.    7. Avoid douching or intercourse for ten days. You may take a shower but do not take a bath for 10 days.    8. Please call in advance and make your appointment for a post-operative checkup at the office in 3-4 week.    9. Please do not hesitate to call if you have any problems or questions

## 2024-10-10 NOTE — ANESTHESIA POSTPROCEDURE EVALUATION
[facility]    Monique Olsen Patient Status:  Hospital Outpatient Surgery   Age/Gender 83 year old female MRN [id number]   Location [facility] SURGERY Attending Valencia Jang MD   Hosp Day # 0 PCP Uriel Marcus MD       Anesthesia Post-op Note    HYSTEROSCOPY DILATION AND CURETTAGE, and Papanicolaou Smear    Procedure Summary       Date: 10/10/24 Room / Location:  MAIN OR 03 / [facility] MAIN OR    Anesthesia Start: 0733 Anesthesia Stop: 0814    Procedure: HYSTEROSCOPY DILATION AND CURETTAGE, and Papanicolaou Smear Diagnosis:       Postmenopausal bleeding      (Postmenopausal bleeding [N95.0])    Surgeons: Valencia Jang MD Anesthesiologist: Latrell Ritchie MD    Anesthesia Type: MAC ASA Status: 2            Anesthesia Type: MAC    Vitals Value Taken Time   /69 10/10/24 0814   Temp 97 10/10/24 0814   Pulse 70 10/10/24 0814   Resp 16 10/10/24 0814   SpO2 97 10/10/24 0814       Patient Location: Same Day Surgery    Anesthesia Type: MAC    Airway Patency: patent    Postop Pain Control: adequate    Mental Status: preanesthetic baseline    Nausea/Vomiting: none    Cardiopulmonary/Hydration status: stable euvolemic    Complications: no apparent anesthesia related complications    Postop vital signs: stable    Dental Exam: Unchanged from Preop    Patient to be discharged home when criteria met.

## 2024-10-10 NOTE — SPIRITUAL CARE NOTE
Spiritual Care Visit Note    Patient Name: Monique Olsen Date of Spiritual Care Visit: 10/10/24   : 1940 Primary Dx: <principal problem not specified>       Referred By:      Spiritual Care Taxonomy:    Intended Effects: Establish rapport and connectedness         Interventions: Explain  role;[place];Discuss frustrations with someone    Visit Type/Summary:     - Spiritual Care: Offered empathic listening and emotional support.  facilitated a discussion on life and normalized the situation. Monique and spouse felt positive about today's procedure and thanked  for visit.  remains available as needed for follow up.    Spiritual Care support can be requested via an Epic consult. For urgent/immediate needs, please contact the On Call  at: Edward: ext [phone]

## 2024-10-10 NOTE — PROGRESS NOTES
[facility]  Brief Op Note    Monique Olsen Location: OR   [facility] [id number] MRN [id number]   Admission Date 10/10/2024 Operation Date 10/10/2024   Attending Physician Valencia Jang MD Operating Physician Valencia Jang MD     Pre-Operative Diagnosis: Postmenopausal bleeding [N95.0]    Post-Operative Diagnosis: Same as above    Procedure Performed: Procedure(s):  HYSTEROSCOPY DILATION AND CURETTAGE, and Papanicolaou Smear    Anesthesia: MAC    EBL: 2 cc    Summary of Case: thin endometrium   min tissue to path  pap  Complications:none  Valencia Jang MD  10/10/2024  8:01 AM

## 2024-10-10 NOTE — OPERATIVE REPORT
[facility]    PATIENT'S NAME: CHETAN COPELAND   ATTENDING PHYSICIAN: Valencia Jang M.D.   OPERATING PHYSICIAN: Valencia Jang M.D.   PATIENT ACCOUNT#:   [id number]    LOCATION:  [facility] 2 EDW 10  MEDICAL RECORD #:   [id number]       YOB: 1940  ADMISSION DATE:       10/10/2024      OPERATION DATE:  10/10/2024    OPERATIVE REPORT      PREOPERATIVE DIAGNOSIS:  Postmenopausal bleeding and abnormal ultrasound.  POSTOPERATIVE DIAGNOSIS:  Postmenopausal bleeding and abnormal ultrasound.  PROCEDURE:      ANESTHESIA:  MAC.    OPERATIVE TECHNIQUE:  The patient was prepped and draped in the usual sterile manner.  Bladder was emptied with a straight cath.  Exam under anesthesia revealed a small cervix, no adnexal masses.  A pediatric speculum was used.  The anterior lip of the cervix was grasped with the tenaculum.  Cervix was dilated.  Hysteroscope was introduced into the uterine cavity.  The uterine cavity did not have any abnormalities; the lining was very thin.  Pap smear had been previously done.  Sharp curettage with minimal tissue went to Pathology.  Sponge and instrument counts were correct.  The patient went to same-day surgery in good condition, had an IV in place.     Dictated By Valencia Jang M.D.  d: 10/10/2024 08:04:32  t: 10/10/2024 16:46:56  Job [id number]  KLD/   Wife aware of results, will wait for call from PSR to help with Rheumatology referral

## 2024-10-18 LAB
.: NORMAL
.: NORMAL

## 2024-10-23 ENCOUNTER — OFFICE VISIT (OUTPATIENT)
Facility: CLINIC | Age: 84
End: 2024-10-23
Payer: MEDICARE

## 2024-10-23 VITALS
BODY MASS INDEX: 18.07 KG/M2 | HEIGHT: 63 IN | WEIGHT: 102 LBS | HEART RATE: 88 BPM | DIASTOLIC BLOOD PRESSURE: 62 MMHG | SYSTOLIC BLOOD PRESSURE: 128 MMHG

## 2024-10-23 DIAGNOSIS — N95.0 POSTMENOPAUSAL BLEEDING: Primary | ICD-10-CM

## 2024-10-23 PROCEDURE — 99024 POSTOP FOLLOW-UP VISIT: CPT | Performed by: OBSTETRICS & GYNECOLOGY

## 2024-10-23 NOTE — PROGRESS NOTES
She had some constipation after surgery no vaginal bleeding.  Benign pathology discussed.  External genitalia without lesions vagina atrophic cervix without lesions.  She is not sexually active she stopped being sexually active after she had several bladder infections.  Vaginal estrogen or d-mannose were offered.

## 2024-12-09 ENCOUNTER — TELEPHONE (OUTPATIENT)
Dept: FAMILY MEDICINE CLINIC | Facility: CLINIC | Age: 84
End: 2024-12-09

## 2024-12-09 ENCOUNTER — HOSPITAL ENCOUNTER (OUTPATIENT)
Age: 84
Discharge: HOME | End: 2024-12-09
Payer: MEDICARE

## 2024-12-09 VITALS
OXYGEN SATURATION: 97 % | DIASTOLIC BLOOD PRESSURE: 83 MMHG | TEMPERATURE: 98 F | SYSTOLIC BLOOD PRESSURE: 153 MMHG | BODY MASS INDEX: 17.72 KG/M2 | HEART RATE: 85 BPM | HEIGHT: 63 IN | RESPIRATION RATE: 18 BRPM | WEIGHT: 100 LBS

## 2024-12-09 DIAGNOSIS — R30.0 DYSURIA: ICD-10-CM

## 2024-12-09 DIAGNOSIS — N30.00 ACUTE CYSTITIS WITHOUT HEMATURIA: Primary | ICD-10-CM

## 2024-12-09 LAB
BILIRUB UR QL STRIP: NEGATIVE
CLARITY UR: CLEAR
COLOR UR: YELLOW
GLUCOSE UR STRIP-MCNC: NEGATIVE MG/DL
KETONES UR STRIP-MCNC: NEGATIVE MG/DL
LEUKOCYTE ESTERASE UR QL STRIP: NEGATIVE
NITRITE UR QL STRIP: NEGATIVE
PH UR STRIP: 6 [PH]
PROT UR STRIP-MCNC: NEGATIVE MG/DL
SP GR UR STRIP: <=1.005
UROBILINOGEN UR STRIP-ACNC: <2 MG/DL

## 2024-12-09 PROCEDURE — 99214 OFFICE O/P EST MOD 30 MIN: CPT | Performed by: PHYSICIAN ASSISTANT

## 2024-12-09 PROCEDURE — 81002 URINALYSIS NONAUTO W/O SCOPE: CPT | Performed by: PHYSICIAN ASSISTANT

## 2024-12-09 RX ORDER — SULFAMETHOXAZOLE AND TRIMETHOPRIM 800; 160 MG/1; MG/1
1 TABLET ORAL 2 TIMES DAILY
Qty: 10 TABLET | Refills: 0 | Status: SHIPPED | OUTPATIENT
Start: 2024-12-09 | End: 2024-12-14

## 2024-12-09 NOTE — ED PROVIDER NOTES
Patient Seen in: [facility]      History     Chief Complaint   Patient presents with    Urinary Symptoms     Stated Complaint: UTI    Subjective:   HPI    84-year-old female with known history of asthma and legionnaires disease with GERD comes in today complaining of urinary frequency urgency and dysuria the symptoms all began yesterday.  Patient states that she has known UTIs this feels exactly how her normal UTI does and she started on 2 doses of bactrim and pyridium. Denies Vomiting.  Denies fever or chills.  Denies abdominal pain.  Denies back pain.    Objective:     Past Medical History:    Abnormal uterine bleeding    Very light bleeding    Anesthesia complication    Asthma (HCC)    Dermatographic urticaria    Disorder of thyroid    Esophageal reflux    H/O pelvic ultrasound    1. Anteverted uterus without fibroids. 2. Heterogeneity of endometrium measuring up to 5mm in thickness. Focal echogenic structure of fundal endometrium (3 x 3 x 3 mm) may represent a polyp or submucosal fibroid. If postmenopausal bleeding persists, recommend direct visual inspection and/or tissue sampling.  3. Normal sonographic appearance of the right ovary.  Nonvisualization of the left ovary.    Heart valve disease    mitral valve prolapse    Hx of motion sickness    Legionnaire's disease (HCC)    Lumbago    Mitral valve prolapse determined by imaging    Osteoarthritis    Personal history of arthritis    Personal history of pneumonia (recurrent)    PONV (postoperative nausea and vomiting)    with twilight    Sprain of lumbar region    Thyroid disease    Visual impairment    reading glasses              Past Surgical History:   Procedure Laterality Date    Cholecystectomy      Colonoscopy      Other surgical history  10/10/2024    HYSTEROSCOPY DILATION AND CURETTAGE. DR. Valencia Jang.   Endometrial curettings: -Fragments of atrophic squamous mucosa. -Small bits of benign endocervical glandular tissue. -Abundant  mucus with macrophages.    Tonsillectomy                  Social History     Socioeconomic History    Marital status:    Tobacco Use    Smoking status: Former     Current packs/day: 0.00     Average packs/day: 1 pack/day for 50.0 years (50.0 ttl pk-yrs)     Types: Cigarettes     Quit date: 1970     Years since quittin.6    Smokeless tobacco: Never   Vaping Use    Vaping status: Never Used   Substance and Sexual Activity    Alcohol use: Not Currently    Drug use: No   Other Topics Concern    Caffeine Concern No    Stress Concern No    Weight Concern No    Special Diet No    Exercise No    Seat Belt No              Review of Systems    Positive for stated complaint: UTI  Other systems are as noted in HPI.  Constitutional and vital signs reviewed.      All other systems reviewed and negative except as noted above.    Physical Exam     ED Triage Vitals [24 1338]   /83   Pulse 85   Resp 18   Temp 98.3 °F (36.8 °C)   Temp src Oral   SpO2 97 %   O2 Device None (Room air)       Current Vitals:   Vital Signs  BP: 153/83  Pulse: 85  Resp: 18  Temp: 98.3 °F (36.8 °C)  Temp src: Oral    Oxygen Therapy  SpO2: 97 %  O2 Device: None (Room air)        Physical Exam  General Appearance:  Alert and orientated x 4, cooperative, no distress, appropriate for age  Head:  Normocephalic, atraumatic, without obvious abnormality  Neck:  Supple; symmetrical  Lungs:  Clear to auscultation bilaterally, respirations unlabored, no wheezing, rales or rhonchi   Heart:  Regular rate & rhythm, S1 and S2 normal, no murmurs, rubs, or gallops  Abdomen:  Soft, mild suprapubic tenderness,  no mass or organomegaly. No rebound tenderness or guarding, negative CVA bilaterally                Skin/Hair/Nails:  Skin warm, dry and intact, no rashes or abnormal dyspigmentation  Neurologic:   Grossly intact, gait normal       ED Course     Labs Reviewed   [facility] POCT URINALYSIS DIPSTICK - Abnormal; Notable for the following components:        Result Value    Blood, Urine Trace-Intact (*)     All other components within normal limits   URINE CULTURE, ROUTINE        MDM          Medical Decision Making  84-year-old female who comes in today complaining of urinary frequency urgency and dysuria    Problems Addressed:  Acute cystitis without hematuria: acute illness or injury  Dysuria: acute illness or injury    Amount and/or Complexity of Data Reviewed  Labs: ordered. Decision-making details documented in ED Course.     Details: UA trace blood, urine culture pending    Risk  OTC drugs.  Prescription drug management.  Risk Details: Discussed with the patient the results of her urine, patient already took 2 doses bactrim could be affecting our sample    Clinical impression: UTI  Differential diagnosis: Dysuria, urethritis, acute cystitis, pyelonephritis  Plan: The patient's urine culture is pending. The patient will be started on bactrim for 5 days.  The patient will be provided with a prescription for Pyridium for symptomatic relief.  The patient is instructed to return if they develop nausea, vomiting, fever or back pain.  The patient will be contacted regarding the urine culture if their antibiotic needs to be changed.  The patient is in good condition throughout their visit today and remains so upon discharge.  I discussed the plan of care with the patient, who expresses understanding.  All questions and concerns are addressed to the patient's satisfaction prior to discharge today.        Disposition and Plan     Clinical Impression:  1. Acute cystitis without hematuria    2. Dysuria         Disposition:  Discharge  12/9/2024  2:21 pm    Follow-up:  Uriel Marcus MD  [address] [place] [id number]  [phone]    Schedule an appointment as soon as possible for a visit   If symptoms worsen          Medications Prescribed:  Discharge Medication List as of 12/9/2024  2:25 PM        START taking these medications    Details    sulfamethoxazole-trimethoprim -160 MG Oral Tab per tablet Take 1 tablet by mouth 2 (two) times daily for 5 days., Normal, Disp-10 tablet, R-0                 Supplementary Documentation:

## 2024-12-11 NOTE — ED NOTES
Patient notified feeling better. Notified of culture result but patient wants to finish ABX despite negative culture. Patient feels medication is helping

## 2025-01-03 ENCOUNTER — PATIENT MESSAGE (OUTPATIENT)
Dept: FAMILY MEDICINE CLINIC | Facility: CLINIC | Age: 85
End: 2025-01-03

## 2025-01-03 DIAGNOSIS — E03.8 OTHER SPECIFIED HYPOTHYROIDISM: ICD-10-CM

## 2025-01-03 RX ORDER — LEVOTHYROXINE SODIUM 50 UG/1
50 TABLET ORAL
Qty: 90 TABLET | Refills: 2 | Status: SHIPPED | OUTPATIENT
Start: 2025-01-03

## 2025-03-08 ENCOUNTER — HOSPITAL ENCOUNTER (OUTPATIENT)
Age: 85
Discharge: HOME | End: 2025-03-08
Payer: MEDICARE

## 2025-03-08 VITALS
DIASTOLIC BLOOD PRESSURE: 87 MMHG | RESPIRATION RATE: 18 BRPM | OXYGEN SATURATION: 98 % | HEART RATE: 100 BPM | SYSTOLIC BLOOD PRESSURE: 157 MMHG

## 2025-03-08 DIAGNOSIS — R30.0 DYSURIA: Primary | ICD-10-CM

## 2025-03-08 LAB
BILIRUB UR QL STRIP: NEGATIVE
COLOR UR: YELLOW
GLUCOSE UR STRIP-MCNC: NEGATIVE MG/DL
KETONES UR STRIP-MCNC: NEGATIVE MG/DL
NITRITE UR QL STRIP: NEGATIVE
PH UR STRIP: 6 [PH]
PROT UR STRIP-MCNC: NEGATIVE MG/DL
SP GR UR STRIP: <=1.005
UROBILINOGEN UR STRIP-ACNC: <2 MG/DL

## 2025-03-08 PROCEDURE — 81002 URINALYSIS NONAUTO W/O SCOPE: CPT | Performed by: NURSE PRACTITIONER

## 2025-03-08 PROCEDURE — 99214 OFFICE O/P EST MOD 30 MIN: CPT | Performed by: NURSE PRACTITIONER

## 2025-03-08 RX ORDER — NITROFURANTOIN 25; 75 MG/1; MG/1
100 CAPSULE ORAL 2 TIMES DAILY
Qty: 10 CAPSULE | Refills: 0 | Status: SHIPPED | OUTPATIENT
Start: 2025-03-08 | End: 2025-03-13

## 2025-03-08 NOTE — DISCHARGE INSTRUCTIONS
- Given your recurring symptoms and history of negative urine culture growth, you should see a Urologist to determine if there are other causes of your symptoms which are not due to infection (e.g. interstitial cystitis, etc.)    Urinary Tract Infection (UTI) care measures:   - Take antibiotics as directed and to completion   - Drink plenty of water   - Avoid sugary drinks (e.g. Soda, juice, etc.)   - You may benefit from taking Probiotics whenever you take antibiotic to restore good gut bacteria   - You may benefit from Azo (Pyridium) for bladder pain relief (will turn your urine orange)   - Drinking alcohol can make you very sick and/or make the antibiotic less effective   - Make appointment with Urology or Urogynecology (contacts provided)

## 2025-03-08 NOTE — ED PROVIDER NOTES
History     Chief Complaint   Patient presents with    Urinary Symptoms       Subjective:   HPI    Monique Olsen, 84 year old female with notable medical history of asthma, OA, hypothyroid who presents with urinary concerns.  Patient reports developing dysuria starting overnight along with urinary frequency, but having output with each bathroom trip. She reports Hx recurrent UTIs and had extra Bactrim and took one tablet ~0400. Patient has seen gynecology in the past, but not urology. Denies fever, chills, n/v/d.      Patient Active Problem List   Diagnosis    Asthma (HCC)    Primary localized osteoarthrosis, hand    Hypothyroid    Allergic rhinitis due to allergen    Mitral valve prolapse determined by imaging    Osteoarthritis of right knee    Primary osteoarthritis of both hips    Postmenopausal bleeding      Objective:   Past Medical History:    Abnormal uterine bleeding    Very light bleeding    Anesthesia complication    Asthma (HCC)    Dermatographic urticaria    Disorder of thyroid    Esophageal reflux    H/O pelvic ultrasound    1. Anteverted uterus without fibroids. 2. Heterogeneity of endometrium measuring up to 5mm in thickness. Focal echogenic structure of fundal endometrium (3 x 3 x 3 mm) may represent a polyp or submucosal fibroid. If postmenopausal bleeding persists, recommend direct visual inspection and/or tissue sampling.  3. Normal sonographic appearance of the right ovary.  Nonvisualization of the left ovary.    Heart valve disease    mitral valve prolapse    Hx of motion sickness    Legionnaire's disease (HCC)    Lumbago    Mitral valve prolapse determined by imaging    Osteoarthritis    Personal history of arthritis    Personal history of pneumonia (recurrent)    PONV (postoperative nausea and vomiting)    with twilight    Sprain of lumbar region    Thyroid disease    Visual impairment    reading glasses              Past Surgical History:   Procedure Laterality Date    Cholecystectomy       Colonoscopy      Other surgical history  10/10/2024    HYSTEROSCOPY DILATION AND CURETTAGE. DR. Valencia Jang.   Endometrial curettings: -Fragments of atrophic squamous mucosa. -Small bits of benign endocervical glandular tissue. -Abundant mucus with macrophages.    Tonsillectomy                  Social History     Socioeconomic History    Marital status:    Tobacco Use    Smoking status: Former     Current packs/day: 0.00     Average packs/day: 1 pack/day for 50.0 years (50.0 ttl pk-yrs)     Types: Cigarettes     Quit date: 1970     Years since quittin.8    Smokeless tobacco: Never   Vaping Use    Vaping status: Never Used   Substance and Sexual Activity    Alcohol use: Not Currently    Drug use: No   Other Topics Concern    Caffeine Concern No    Stress Concern No    Weight Concern No    Special Diet No    Exercise No    Seat Belt No              Medications Ordered Prior to Encounter[1]      Constitutional and vital signs reviewed.      All other systems reviewed and negative except as noted above.    I have reviewed the family history, social history, allergies, and outpatient medications.     History reviewed from EMR: Encounters, problem list, allergies, medications      Physical Exam     ED Triage Vitals [25 0809]   /87   Pulse 100   Resp 18   Temp    Temp src    SpO2 98 %   O2 Device None (Room air)       Current:/87   Pulse 100   Resp 18   SpO2 98%       Physical Exam  Vitals and nursing note reviewed.   Constitutional:       General: She is not in acute distress.     Appearance: Normal appearance. She is normal weight. She is not ill-appearing or toxic-appearing.   HENT:      Head: Normocephalic and atraumatic.      Right Ear: External ear normal.      Left Ear: External ear normal.      Nose: Nose normal.      Mouth/Throat:      Mouth: Mucous membranes are moist.   Eyes:      Extraocular Movements: Extraocular movements intact.      Conjunctiva/sclera: Conjunctivae  normal.      Pupils: Pupils are equal, round, and reactive to light.   Cardiovascular:      Rate and Rhythm: Normal rate.      Pulses: Normal pulses.   Pulmonary:      Effort: Pulmonary effort is normal. No respiratory distress.   Musculoskeletal:         General: No swelling, tenderness or signs of injury. Normal range of motion.      Cervical back: Normal range of motion and neck supple.   Skin:     General: Skin is warm and dry.      Capillary Refill: Capillary refill takes less than 2 seconds.      Coloration: Skin is not jaundiced.   Neurological:      General: No focal deficit present.      Mental Status: She is alert and oriented to person, place, and time. Mental status is at baseline.   Psychiatric:         Mood and Affect: Mood normal.         Behavior: Behavior normal.         Thought Content: Thought content normal.         Judgment: Judgment normal.            ED Course     Labs Reviewed   [facility] POCT URINALYSIS DIPSTICK - Abnormal; Notable for the following components:       Result Value    Urine Clarity Slightly cloudy (*)     Blood, Urine Small (*)     Leukocyte esterase urine Small (*)     All other components within normal limits   URINE CULTURE, ROUTINE     No orders to display       Vitals:    03/08/25 0809   BP: 157/87   Pulse: 100   Resp: 18   SpO2: 98%            [facility]        Monique Olsen, 84 year old female with medical history as noted above who presents with dysuria   - Patient in Nad, VSS   - UTI vs interstitial cystitis vs OAB vs pelvic floor vs other   - Given recent GI issues, subjective symptoms, and trace leuks, will empirically treat and send urine culture   - Advised f/u with Urology given recurrent symptoms and multiple negative cultures in the past       ** See ED course below for additional information on care provided / interventions / notable events throughout patient's encounter.    ** See Home Care Instructions below for care measures to trial as applicable.         ** I have  independently reviewed the radiology images, clinical lab results, and ECG tracings as described above (if applicable)    ** Concerning co-morbidities possibly affecting complaint / care: Hx recurrent UTI    ** See disposition & plan section below for home care instructions - if applicable        Medical Decision Making  Amount and/or Complexity of Data Reviewed  Labs: ordered. Decision-making details documented in ED Course.    Risk  OTC drugs.  Prescription drug management.        Disposition and Plan     Disposition:  Discharge  3/8/2025  8:46 am    Clinical Impression:  1. Dysuria            Home care instructions:     - Given your recurring symptoms and history of negative urine culture growth, you should see a Urologist to determine if there are other causes of your symptoms which are not due to infection (e.g. interstitial cystitis, etc.)    Urinary Tract Infection (UTI) care measures:   - Take antibiotics as directed and to completion   - Drink plenty of water   - Avoid sugary drinks (e.g. Soda, juice, etc.)   - You may benefit from taking Probiotics whenever you take antibiotic to restore good gut bacteria   - You may benefit from Azo (Pyridium) for bladder pain relief (will turn your urine orange)   - Drinking alcohol can make you very sick and/or make the antibiotic less effective   - Make appointment with Urology or Urogynecology (contacts provided)      Follow-up:  Lauryn Dickens,   [address]  [facility] [address]  [facility] [id number] [phone]      Uro-gynecology specialist    Prabhjot Miles MD  [address]  [facility] [id number] [phone]      Urology specialist          Medications Prescribed:  Current Discharge Medication List        START taking these medications    Details   nitrofurantoin monohydrate macro 100 MG Oral Cap Take 1 capsule (100 mg total) by mouth 2 (two) times daily for 5 days.  Qty: 10 capsule, Refills: 0               Conner Hwang, DNP, APRN, AGACNP-BC, FNP-C,  CNL  Adult-Gerontology Acute Care & Family Nurse Practitioner  [facility]      The above patient (and/or guardian) was made aware that an appropriate evaluation has been performed, and that no additional testing is required at this time. In my medical judgment, there is currently no evidence of an immediate life-threatening or surgical condition, therefore discharge is indicated at this time. The patient (and/or guardian) was advised that a small risk still exists that a serious condition could develop. The patient was instructed to arrange close follow-up with their primary care provider (or the referral provider given today). The patient received written and verbal instructions regarding their condition / concerns, demonstrated understanding, and is agreement with the outpatient treatment plan.              [1]   No current facility-administered medications on file prior to encounter.     Current Outpatient Medications on File Prior to Encounter   Medication Sig Dispense Refill    levothyroxine 50 MCG Oral Tab Take 1 tablet (50 mcg total) by mouth before breakfast. 90 tablet 2    acetaminophen 500 MG Oral Tab Take 2 tablets (1,000 mg total) by mouth every 6 (six) hours as needed for Pain.      montelukast 10 MG Oral Tab Take 1 tablet (10 mg total) by mouth nightly. 90 tablet 1    albuterol 108 (90 Base) MCG/ACT Inhalation Aero Soln Inhale 2 puffs into the lungs every 6 (six) hours as needed for Wheezing. 18 each 1    Fluticasone Propionate (KLS ALLER-FLORENTINO NA) 1 spray by Nasal route daily.      acidophilus-pectin Oral Cap Take 1 capsule by mouth 2 (two) times daily.      Misc Natural Products (OSTEO BI-FLEX ADV DOUBLE ST OR) Take 1 tablet by mouth daily.      omega-3 fatty acids 1000 MG Oral Cap Take 2,000 mg by mouth daily. 60 capsule 12    [] sulfamethoxazole-trimethoprim -160 MG Oral Tab per tablet Take 1 tablet by mouth 2 (two) times daily for 5 days. 10 tablet 0

## 2025-03-24 ENCOUNTER — LAB ENCOUNTER (OUTPATIENT)
Dept: LAB | Age: 85
End: 2025-03-24
Attending: FAMILY MEDICINE
Payer: MEDICARE

## 2025-03-24 DIAGNOSIS — N30.00 ACUTE CYSTITIS WITHOUT HEMATURIA: ICD-10-CM

## 2025-03-24 PROCEDURE — 87086 URINE CULTURE/COLONY COUNT: CPT

## 2025-03-26 ENCOUNTER — TELEPHONE (OUTPATIENT)
Dept: UROLOGY | Facility: CLINIC | Age: 85
End: 2025-03-26

## 2025-03-26 NOTE — TELEPHONE ENCOUNTER
Spoke with patient reminding her of her upcoming new patient appointment with Dr Dickens on 4/1/25. Reminded patient to bring with filled out paperwork, photo id, and insurance card(s). Also reminded patient of our [place] address and left our office phone number.

## 2025-04-01 ENCOUNTER — OFFICE VISIT (OUTPATIENT)
Dept: UROLOGY | Facility: CLINIC | Age: 85
End: 2025-04-01
Attending: OBSTETRICS & GYNECOLOGY
Payer: MEDICARE

## 2025-04-01 VITALS
WEIGHT: 103.81 LBS | BODY MASS INDEX: 18.39 KG/M2 | DIASTOLIC BLOOD PRESSURE: 68 MMHG | HEIGHT: 63 IN | TEMPERATURE: 98 F | SYSTOLIC BLOOD PRESSURE: 130 MMHG

## 2025-04-01 DIAGNOSIS — N81.84 PELVIC MUSCLE WASTING: ICD-10-CM

## 2025-04-01 DIAGNOSIS — R35.1 NOCTURIA: Primary | ICD-10-CM

## 2025-04-01 DIAGNOSIS — N95.2 POSTMENOPAUSAL ATROPHIC VAGINITIS: ICD-10-CM

## 2025-04-01 DIAGNOSIS — R35.0 URINARY FREQUENCY: ICD-10-CM

## 2025-04-01 DIAGNOSIS — N76.3 CHRONIC VULVITIS: ICD-10-CM

## 2025-04-01 LAB
BILIRUB UR QL STRIP.AUTO: NEGATIVE
CLARITY UR REFRACT.AUTO: CLEAR
COLOR UR AUTO: COLORLESS
CONTROL RUN WITHIN 24 HOURS?: YES
GLUCOSE UR STRIP.AUTO-MCNC: NORMAL MG/DL
KETONES UR STRIP.AUTO-MCNC: NEGATIVE MG/DL
LEUKOCYTE ESTERASE UR QL STRIP.AUTO: NEGATIVE
LEUKOCYTE ESTERASE URINE: NEGATIVE
NITRITE UR QL STRIP.AUTO: NEGATIVE
NITRITE URINE: NEGATIVE
PH UR STRIP.AUTO: 5.5 [PH] (ref 5–8)
PROT UR STRIP.AUTO-MCNC: NEGATIVE MG/DL
RBC UR QL AUTO: NEGATIVE
SP GR UR STRIP.AUTO: 1 (ref 1–1.03)
UROBILINOGEN UR STRIP.AUTO-MCNC: NORMAL MG/DL

## 2025-04-01 PROCEDURE — 99212 OFFICE O/P EST SF 10 MIN: CPT

## 2025-04-01 PROCEDURE — 87086 URINE CULTURE/COLONY COUNT: CPT | Performed by: OBSTETRICS & GYNECOLOGY

## 2025-04-01 PROCEDURE — 81002 URINALYSIS NONAUTO W/O SCOPE: CPT | Performed by: OBSTETRICS & GYNECOLOGY

## 2025-04-01 PROCEDURE — 81003 URINALYSIS AUTO W/O SCOPE: CPT | Performed by: OBSTETRICS & GYNECOLOGY

## 2025-04-01 RX ORDER — CLOTRIMAZOLE AND BETAMETHASONE DIPROPIONATE 10; .64 MG/G; MG/G
1 CREAM TOPICAL 2 TIMES DAILY
Qty: 45 G | Refills: 1 | Status: SHIPPED | OUTPATIENT
Start: 2025-04-01

## 2025-04-01 RX ORDER — ESTRADIOL 0.1 MG/G
CREAM VAGINAL
Qty: 42 G | Refills: 3 | Status: SHIPPED | OUTPATIENT
Start: 2025-04-01

## 2025-04-01 NOTE — PROGRESS NOTES
Lauryn Dickens,   2025     Referred by Dr. Marcus  Pt here with self ( in waiting area)    Chief Complaint   Patient presents with    Urinary Symptoms     Referred by Dr. 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 Sx come and go, she c/o vulvar irritation    Vitals:  /68   Temp 98.1 °F (36.7 °C)   Ht 63\"   Wt 103 lb 12.8 oz (47.1 kg)   BMI 18.39 kg/m²      HISTORY:  Past Medical History:    Abnormal uterine bleeding    Very light bleeding    Anesthesia complication    Asthma (HCC)    Dermatographic urticaria    Disorder of thyroid    Esophageal reflux    H/O pelvic ultrasound    1. Anteverted uterus without fibroids. 2. Heterogeneity of endometrium measuring up to 5mm in thickness. Focal echogenic structure of fundal endometrium (3 x 3 x 3 mm) may represent a polyp or submucosal fibroid. If postmenopausal bleeding persists, recommend direct visual inspection and/or tissue sampling.  3. Normal sonographic appearance of the right ovary.  Nonvisualization of the left ovary.    Heart valve disease    mitral valve prolapse    Hx of motion sickness    Legionnaire's disease (HCC)    Lumbago    Mitral valve prolapse determined by imaging    Osteoarthritis    Personal history of arthritis    Personal history of pneumonia (recurrent)    PONV (postoperative nausea and vomiting)    with twilight    Sprain of lumbar region    Thyroid disease    Visual impairment    reading glasses      Past Surgical History:   Procedure Laterality Date     Cholecystectomy      Colonoscopy      Other surgical history  10/10/2024    HYSTEROSCOPY DILATION AND CURETTAGE. DR. Valencia Jang.   Endometrial curettings: -Fragments of atrophic squamous mucosa. -Small bits of benign endocervical glandular tissue. -Abundant mucus with macrophages.    Tonsillectomy        Family History   Problem Relation Age of Onset    Other (prostate disorders) Father     Breast Cancer Mother 76        pt not sure if her mother had breast ca or not, she thinks so though    Arthritis Mother     Other (allergies) Daughter     Other (allergies) Son     Arthritis Maternal Grandmother     No Known Problems Maternal Grandfather     No Known Problems Paternal Grandmother     No Known Problems Paternal Grandfather     Prostate Cancer Maternal Uncle     Stroke Neg     Heart Disease Neg     Ovarian Cancer Neg     Uterine Cancer Neg     Pancreatic Cancer Neg     Colon Cancer Neg       Social History     Socioeconomic History    Marital status:    Tobacco Use    Smoking status: Former     Current packs/day: 0.00     Average packs/day: 1 pack/day for 50.0 years (50.0 ttl pk-yrs)     Types: Cigarettes     Quit date: 1970     Years since quittin.9    Smokeless tobacco: Never   Vaping Use    Vaping status: Never Used   Substance and Sexual Activity    Alcohol use: Not Currently    Drug use: No   Other Topics Concern    Caffeine Concern No    Stress Concern No    Weight Concern No    Special Diet No    Exercise No    Seat Belt No        Allergies:  Allergies[1]    Medications:  Medications Prior to Visit[2]    Urogynecology Summary:  Urogynecology Summary  Prolapse: No  ASHLEY: No  Urge Incontinence: No  Nocturia Frequency: 2 (1-2)  Frequency: 2 hours  Incomplete emptying: No  Constipation: No (sometimes)  Wears pad day?: 0  Wears Pad Night?: 0  Activities are limited by UI/POP?: No  Currently Sexually Active: No    Review of Systems:    A comprehensive 12 point review of systems was completed.   Pertinent positives noted in the the HPI.  No CP  No SOB    GENERAL EXAM:  GENERAL:  Alert and Oriented, and NAD  HEENT:  Normal, no lesions  LUNGS:  Normal effort  HEART:  RRR  ABDOMEN: soft, no mass, no hernia  EXTREM:  Normal, no edema  SKIN:  Normal, no lesions    PELVIC EXAM:  Ext. Gen: +atrophy, no lesions  Urethra: +atrophy, nontender  Bladder:+fullness, nontender  Vagina: +atrophy  Cervix: no bleeding, no lesions, nontender  Uterus: +mobile  Adnexa:no masses, nontender  Perineum:somewhat tender, scant lichenification, +irritation & erythema  Anus: wnl  Rectum: defer    PELVIS FLOOR NEUROMUSCULAR FUNCTION:  Strength:  1, Unable to hold greater than 3 sec, and Recruitment  Perineal Sensation:  Normal      PELVIC SUPPORT:  [place]:  1  Ant:  1  Post:  1  CST:  negative  UVJ: +hypermobile    Pt examined with chaperone, RN    Impression/Plan:    ICD-10-CM    1. Nocturia  R35.1 POCT urinalysis dipstick[21339]     Urinalysis, Routine     Urine Culture, Routine      2. Urinary frequency  R35.0 POCT urinalysis dipstick[72803]     Urinalysis, Routine     Urine Culture, Routine      3. Pelvic muscle wasting  N81.84       4. Postmenopausal atrophic vaginitis  N95.2 estradiol (ESTRACE) 0.1 MG/GM Vaginal Cream      5. Chronic vulvitis  N76.3 clotrimazole-betamethasone 1-0.05 % External Cream          Discussion Items:   Urodynamics and cystoscopy for evaluation of LUTS  Behavioral and pharmacologic treatments for OAB  Pelvic muscle rehabilitation including pelvic floor PT  Topical estrogen therapy for treating UGA  Behavioral and medical treatment for recurrent UTIs  Discussed dietary and behavioral modification, discussed pharmacologic and nonpharmacologic mgmt options for urinary symptoms.     Discussed mgmt of vulvovaginal atrophy with vaginal estrogen cream. Reviewed associated benefits, risks, alternatives, and goals. Recommend low dose twice weekly mgmt     Discussed vulvar complaints & vulvitis      Diagnostic  Items:  Urine testing    Medications Discussed:  Estrace Cream  Lotrisone BID x1 wk     Treatment Plan, Non-surgical:   RN teaching/pt education done  Estrace / Premarin cream  lotrisone    Treatment Plan, Surgical:   None    Discussed mgmt of vulvovaginal atrophy with vaginal estrogen cream. Reviewed associated benefits, risks, alternatives, and goals. Recommend low dose twice weekly mgmt     Discussed management of recurrent urinary tract infections. Discussed use of pharmacologic treatment options for suppression therapy. Risks vs benefits reviewed with patient     Pt verbalizes understanding of all above discussed information. All questions answered. She agrees to plan    Return in about 3 months (around 7/1/2025) for sx f/u.    Lauryn Dickens DO, FACOG, FACS      Discussion undertaken in English, info provided      The 21st Century Cures Act makes medical notes like these available to patients in the interest of transparency. However, be advised this is a medical document. It is intended as peer to peer communication. It is written in medical language and may contain abbreviations or verbiage that are unfamiliar. It may appear blunt or direct. Medical documents are intended to carry relevant information, facts as evident, and the clinical opinion of the practitioner.          [1]   Allergies  Allergen Reactions    Bactrim [Sulfamethoxazole W/Trimethoprim] SHORTNESS OF BREATH    Penicillins HIVES, SWELLING and Tightness in Throat     No skin blisters, no skin peeling, no internal organ involvement.    Benadryl [Altaryl] NAUSEA AND VOMITING and PAIN     headache    Codeine [Opioid Analgesics] NAUSEA AND VOMITING and PAIN     headache    Erythromycin NAUSEA AND VOMITING    Advil [Ibuprofen] OTHER (SEE COMMENTS)     Due to ulcers    Aspirin OTHER (SEE COMMENTS)     Due to ulcers    Naproxen OTHER (SEE COMMENTS)     Due to ulcers   [2]   Outpatient Medications Prior to Visit   Medication Sig Dispense Refill     levothyroxine 50 MCG Oral Tab Take 1 tablet (50 mcg total) by mouth before breakfast. 90 tablet 2    acetaminophen 500 MG Oral Tab Take 2 tablets (1,000 mg total) by mouth every 6 (six) hours as needed for Pain.      montelukast 10 MG Oral Tab Take 1 tablet (10 mg total) by mouth nightly. 90 tablet 1    albuterol 108 (90 Base) MCG/ACT Inhalation Aero Soln Inhale 2 puffs into the lungs every 6 (six) hours as needed for Wheezing. 18 each 1    Fluticasone Propionate (KLS ALLER-FLORENTINO NA) 1 spray by Nasal route daily.      acidophilus-pectin Oral Cap Take 1 capsule by mouth 2 (two) times daily.      Misc Natural Products (OSTEO BI-FLEX ADV DOUBLE ST OR) Take 1 tablet by mouth daily.      omega-3 fatty acids 1000 MG Oral Cap Take 2,000 mg by mouth daily. 60 capsule 12    sulfamethoxazole-trimethoprim -160 MG Oral Tab per tablet Take 1 tablet by mouth 2 (two) times daily. 14 tablet 0     No facility-administered medications prior to visit.

## 2025-04-18 ENCOUNTER — TELEPHONE (OUTPATIENT)
Dept: UROLOGY | Facility: CLINIC | Age: 85
End: 2025-04-18

## 2025-04-18 ENCOUNTER — LAB ENCOUNTER (OUTPATIENT)
Dept: LAB | Age: 85
End: 2025-04-18
Attending: PHYSICIAN ASSISTANT
Payer: MEDICARE

## 2025-04-18 DIAGNOSIS — R35.0 URINARY FREQUENCY: Primary | ICD-10-CM

## 2025-04-18 DIAGNOSIS — R35.0 URINARY FREQUENCY: ICD-10-CM

## 2025-04-18 PROCEDURE — 87086 URINE CULTURE/COLONY COUNT: CPT

## 2025-04-18 PROCEDURE — 87186 SC STD MICRODIL/AGAR DIL: CPT

## 2025-04-18 PROCEDURE — 87088 URINE BACTERIA CULTURE: CPT

## 2025-04-18 RX ORDER — NITROFURANTOIN 25; 75 MG/1; MG/1
100 CAPSULE ORAL 2 TIMES DAILY
Qty: 14 CAPSULE | Refills: 0 | Status: SHIPPED | OUTPATIENT
Start: 2025-04-18

## 2025-04-18 NOTE — TELEPHONE ENCOUNTER
Telephone call received from patient.     Patient complains of UTI signs and symptoms including increased frequency and dysuria x 1 day as well as vulvar irritation    Denies fever    Allergies and previous cultures reviewed    Hx incomplete emptying: N    Not taking suppression    Using Estrace: Y and Lotrisone cream x 1 week as ordered. Instructed to resume    Recent ucxs:   3/24/25 no growth cx  3/8/25 no growth cx  12/9/24 no growth  8/26/24 no growth  3/20/24 no growth    Discussed with DON Rubio     Urine culture ordered, will test @ [place] lab    Encouraged PO hydration    All questions answered    Encouraged to call if symptoms worsen or fail to improve     Patient understands and agrees to plan

## 2025-04-18 NOTE — TELEPHONE ENCOUNTER
Yumiko Cardona PA-C  4/18/25 10:14 AM  Agree, please also send her empiric macrobid 100 mg BID for 7 days incase her symptoms worsen over the weekend     Relayed orders and plan of care to pt. Pt agreeable and expresses gratitude.

## 2025-07-01 ENCOUNTER — VIRTUAL PHONE E/M (OUTPATIENT)
Dept: UROLOGY | Facility: CLINIC | Age: 85
End: 2025-07-01
Attending: OBSTETRICS & GYNECOLOGY
Payer: MEDICARE

## 2025-07-01 DIAGNOSIS — N95.2 POSTMENOPAUSAL ATROPHIC VAGINITIS: ICD-10-CM

## 2025-07-01 DIAGNOSIS — N95.0 PMB (POSTMENOPAUSAL BLEEDING): ICD-10-CM

## 2025-07-01 DIAGNOSIS — N76.3 CHRONIC VULVITIS: ICD-10-CM

## 2025-07-01 DIAGNOSIS — N81.84 PELVIC MUSCLE WASTING: Primary | ICD-10-CM

## 2025-07-01 NOTE — PROGRESS NOTES
Patient to follow up pelvic floor sx  This visit is being conducted as a televisit with pt's consent.  Pt in safe, private environment for televisit, provider located in the office setting    Sx had improved using vag estrogen twice weekly (digital application)  Lotrisone helpful for burning    Blood with wiping today, bright red  Urine clear    Bleeding from vagina area  No s/sx of UTI  UTI in April    Took \"natural\" hormone replacement  Had bleeding in the past, stopped MHT  UTIs worse when she stopped hormones    [facility] D&C with Dr Jang  Endometrial curettings:  -Fragments of atrophic squamous mucosa.  -Small bits of benign endocervical glandular tissue.  -Abundant mucus with macrophages.  -See comment    Impression/Plan:    ICD-10-CM    1. Pelvic muscle wasting  N81.84       2. Postmenopausal atrophic vaginitis  N95.2       3. Chronic vulvitis  N76.3       4. PMB (postmenopausal bleeding)  N95.0 US PELVIS W EV (CPT=76856/68051)          Discussion Items:   Discussed PMB & evaluation    Diagnostic Items:  Pelvis US +/- Sonohysterogram      Hold off on vag estrogen for now  Pelvic ultrasound, follow results    Call with s/sx of UTI (will need ucx)      All questions answered  She understands and agrees to plan    Return for f/u.    Lauryn Dickens, , FACOG, FACS    The 21st Century Cures Act makes medical notes like these available to patients in the interest of transparency. However, be advised this is a medical document. It is intended as peer to peer communication. It is written in medical language and may contain abbreviations or verbiage that are unfamiliar. It may appear blunt or direct. Medical documents are intended to carry relevant information, facts as evident, and the clinical opinion of the practitioner.

## 2025-07-03 ENCOUNTER — TELEPHONE (OUTPATIENT)
Dept: UROLOGY | Facility: CLINIC | Age: 85
End: 2025-07-03

## 2025-07-03 NOTE — TELEPHONE ENCOUNTER
Pt called to report the soonest appt she could get is 7/29/25 for pelvic ultrasound, has f/u with Dr. Dickens 8/5/25 scheduled.  She is on wait list for ultrasound.  Reports vag spotting resolved, stopped Estrace as directed  Answered all questions, she verbalizes understanding.

## 2025-07-17 DIAGNOSIS — J45.20 MILD INTERMITTENT ASTHMA WITHOUT COMPLICATION (HCC): ICD-10-CM

## 2025-07-17 RX ORDER — MONTELUKAST SODIUM 10 MG/1
10 TABLET ORAL DAILY
Qty: 90 TABLET | Refills: 0 | Status: SHIPPED | OUTPATIENT
Start: 2025-07-17

## 2025-07-29 ENCOUNTER — HOSPITAL ENCOUNTER (OUTPATIENT)
Dept: ULTRASOUND IMAGING | Age: 85
Discharge: HOME | End: 2025-07-29
Attending: OBSTETRICS & GYNECOLOGY

## 2025-07-29 DIAGNOSIS — N95.0 PMB (POSTMENOPAUSAL BLEEDING): ICD-10-CM

## 2025-07-29 PROCEDURE — 76856 US EXAM PELVIC COMPLETE: CPT | Performed by: OBSTETRICS & GYNECOLOGY

## 2025-07-29 PROCEDURE — 76830 TRANSVAGINAL US NON-OB: CPT | Performed by: OBSTETRICS & GYNECOLOGY

## 2025-08-05 ENCOUNTER — OFFICE VISIT (OUTPATIENT)
Dept: UROLOGY | Facility: CLINIC | Age: 85
End: 2025-08-05
Attending: OBSTETRICS & GYNECOLOGY

## 2025-08-05 VITALS — TEMPERATURE: 98 F | RESPIRATION RATE: 16 BRPM | HEIGHT: 63 IN | BODY MASS INDEX: 18 KG/M2

## 2025-08-05 DIAGNOSIS — N76.3 CHRONIC VULVITIS: ICD-10-CM

## 2025-08-05 DIAGNOSIS — N95.2 POSTMENOPAUSAL ATROPHIC VAGINITIS: ICD-10-CM

## 2025-08-05 DIAGNOSIS — N81.84 PELVIC MUSCLE WASTING: Primary | ICD-10-CM

## 2025-08-05 PROCEDURE — 99212 OFFICE O/P EST SF 10 MIN: CPT

## (undated) DIAGNOSIS — R39.9 URINARY SYMPTOM OR SIGN: Primary | ICD-10-CM

## (undated) DIAGNOSIS — N39.0 RECURRENT UTI: ICD-10-CM

## (undated) DIAGNOSIS — N30.00 ACUTE CYSTITIS WITHOUT HEMATURIA: Primary | ICD-10-CM

## (undated) DIAGNOSIS — R30.0 DYSURIA: ICD-10-CM

## (undated) DEVICE — PACK GYNE CUSTOM

## (undated) DEVICE — GLOVE SUR 6 SENSICARE PI PIP CRM PWD F

## (undated) DEVICE — PREMIUM WET SKIN PREP TRAY: Brand: MEDLINE INDUSTRIES, INC.

## (undated) DEVICE — SET TUBING DELTER CYSTO IRRIG L77IN DIA0.241IN BLDR NVENT

## (undated) DEVICE — SLEEVE COMPR MD KNEE LEN SGL USE KENDALL SCD

## (undated) NOTE — LETTER
Monique Olsen, :1940    CONSENT FOR PROCEDURE/SEDATION    1. I authorize the performance upon Monique Olsen  the following: Endometrial biopsy procedure    2. I authorize Dr. Valencia Jang MD (and whomever is designated as the doctor’s assistant), to perform the above-mentioned procedures.    3. If any unforeseen conditions arise during this procedure calling for additional  procedures, operations, or medications (including anesthesia and blood transfusion), I further request and authorize the doctor to do whatever he/she deems advisable in my interest.    4. I consent to the taking and reproduction of any photographs in the course of this procedure for professional purposes.    5. I consent to the administration of such sedation as may be considered necessary or advisable by the physician responsible for this service, with the exception of ______________________________________________________    6. I have been informed by my doctor of the nature and purpose of this procedure sedation, possible alternative methods of treatment, risk involved and possible complications.    7. If I have a Do Not Resuscitate (DNR) order in place, the physician and I (or the individual authorized to consent on my behalf) will discuss and agree as to whether the Do Not Resuscitate (DNR) order will remain in effect or will be discontinued during the performance of the procedure and the applicable recovery period. If the Do Not Resuscitate (DNR) order is discontinued and is to be reinstated following the procedure/recovery period, the physician will determine when the applicable recovery period ends for purposes of reinstating the Do Not Resuscitate (DNR) order.    Signature of Patient:_______________________________________________    Signature of person authorized to consent for patient:  _______________________________________________________________    Relationship to patient:  ____________________________________________    Witness: _________________________________________ Date:___________     Physician Signature: _______________________________ Date:___________

## (undated) NOTE — LETTER
[facility] [place] Department of OB/GYN  After Care Instructions for Endometrial Biopsy      Biopsy Results   You will receive a phone call with your biopsy results in 7 business days.  If you have not received your results in 7 days, please contact our office.  The results of your biopsy will determine if further treatment will be necessary.    Bleeding   You may have some light bleeding or blackish clumpy discharge for several days after your biopsy.    Restrictions    You should avoid intercourse or tampon use for 1 day after your biopsy.    Pain    You may experience mild menstrual cramping after your biopsy.  You may use Ibuprofen, Aleve or Tylenol to relieve your discomfort.  If you experience severe or persistent pain contact our office.      If you have any additional questions, please call us at [phone].

## (undated) NOTE — Clinical Note
March 14, 2017    [address]       Dear Blaze Humphrey: It was a pleasure speaking with your  over the phone recently.  To follow up, I wanted to send you my contact information to utilize when you hav patients. Our goal is to make sure that you get the best care possible from everyone involved in your care.  We can help coordinate your visits with other doctors, facilities, and testing; we can talk with you over the phone about your symptoms and any of y and all of your conditions. Please let your physician or our office staff know if you have entered into a similar agreement with another physician/practice. You have a right to:  ?  A comprehensive Care Plan from our practice to help you understand how